# Patient Record
Sex: MALE | Race: WHITE | NOT HISPANIC OR LATINO | Employment: OTHER | URBAN - METROPOLITAN AREA
[De-identification: names, ages, dates, MRNs, and addresses within clinical notes are randomized per-mention and may not be internally consistent; named-entity substitution may affect disease eponyms.]

---

## 2017-07-14 ENCOUNTER — TRANSCRIBE ORDERS (OUTPATIENT)
Dept: URGENT CARE | Facility: MEDICAL CENTER | Age: 72
End: 2017-07-14

## 2017-07-14 ENCOUNTER — OFFICE VISIT (OUTPATIENT)
Dept: URGENT CARE | Facility: MEDICAL CENTER | Age: 72
End: 2017-07-14
Payer: COMMERCIAL

## 2017-07-14 ENCOUNTER — APPOINTMENT (OUTPATIENT)
Dept: RADIOLOGY | Facility: MEDICAL CENTER | Age: 72
End: 2017-07-14
Payer: COMMERCIAL

## 2017-07-14 ENCOUNTER — GENERIC CONVERSION - ENCOUNTER (OUTPATIENT)
Dept: OTHER | Facility: OTHER | Age: 72
End: 2017-07-14

## 2017-07-14 DIAGNOSIS — S69.91XA UNSPECIFIED INJURY OF RIGHT WRIST, HAND AND FINGER(S), INITIAL ENCOUNTER: ICD-10-CM

## 2017-07-14 DIAGNOSIS — M25.532 PAIN IN LEFT WRIST: ICD-10-CM

## 2017-07-14 PROCEDURE — S9088 SERVICES PROVIDED IN URGENT: HCPCS

## 2017-07-14 PROCEDURE — 99213 OFFICE O/P EST LOW 20 MIN: CPT

## 2017-07-14 PROCEDURE — 73110 X-RAY EXAM OF WRIST: CPT

## 2018-01-16 NOTE — MISCELLANEOUS
Message  Message Free Text Note Form: Pt phone not in service when attempting to call and inform pt of possible fracture        Signatures   Electronically signed by : Ramón Eng, [facility]; Nov  3 2017 12:19AM EST                       (Author)

## 2018-01-20 NOTE — PROGRESS NOTES
Assessment   1  Right wrist injury (959 3) (S69 91XA)    Plan    * XR WRIST 3+ VIEW RIGHT; Status:Resulted - Requires Verification;   Done: [id number] 12:00AM     Order Comments:Rm 3       No w/c; QST:[id number];RUOEXEG; Stat;       For:Right wrist injury; Ordered By:Lyle Glover; Discussion/Summary   Discussion Summary:    Rice measures  Take motrin as directed for pain  F/u with ortho no improvement in 1-2 weeks  Medication Side Effects Reviewed: Possible side effects of new medications were reviewed with the patient/guardian today  Understands and agrees with treatment plan: The treatment plan was reviewed with the patient/guardian  The patient/guardian understands and agrees with the treatment plan      Chief Complaint   Chief Complaint Free Text Note Form: pt presents with right hand mosqueda site medial pain r/t to fall on 7/10/17, tripped, braced for fall by putting out hands to stop fall and landed on right hand; states ice and rest, wrap did not help relieve the pain      History of Present Illness   HPI: This is a 70 y/o M c/o R wrist pain s/p fall x 4 days ago  Pt reports he fell o out stretched wrist landing on cement  Pt denies hitting head, LOC  Pt reports decrease strength and pain with any grasping  Denies any numbness, tingling, loss of sensation  Denies previous injury to wrist  Pt is R hand dom  Review of Systems   Focused-Male:      Constitutional: no fever or chills, feels well, no tiredness, no recent weight loss or weight gain  ENT: no complaints of earache, no loss of hearing, no nosebleeds or nasal discharge, no sore throat or hoarseness  Cardiovascular: no complaints of slow or fast heart rate, no chest pain, no palpitations, no leg claudication or lower extremity edema  Respiratory: no complaints of shortness of breath, no wheezing or cough, no dyspnea on exertion, no orthopnea or PND        Gastrointestinal: no complaints of abdominal pain, no constipation, no nausea or vomiting, no diarrhea or bloody stools  Genitourinary: no complaints of dysuria or incontinence, no hesitancy, no nocturia, no genital lesion, no inadequacy of penile erection  Musculoskeletal: no complaints of arthralgia, no myalgia, no joint swelling or stiffness, no limb pain or swelling-- and-- as noted in HPI  Integumentary: no complaints of skin rash or lesion, no itching or dry skin, no skin wounds  Neurological: no complaints of headache, no confusion, no numbness or tingling, no dizziness or fainting  Past Medical History   1  History of high cholesterol (V12 29) (Z86 39)   2  History of hypertension (V12 59) (Z86 79)  Active Problems And Past Medical History Reviewed: The active problems and past medical history were reviewed and updated today  Family History   Mother    1  No pertinent family history  Family History Reviewed: The family history was reviewed and updated today  Social History    · Alcohol use (V49 89) (Z78 9)   · Never a smoker   · No illicit drug use  Social History Reviewed: The social history was reviewed and updated today  Surgical History   Surgical History Reviewed: The surgical history was reviewed and updated today  Current Meds    1  Crestor TABS; Therapy: (Recorded:[id number]) to Recorded   2  Zetia TABS; Therapy: (Recorded:[id number]) to Recorded  Medication List Reviewed: The medication list was reviewed and updated today  Allergies   1   No Known Drug Allergies    Vitals   Signs   Recorded: [id number] 01:25PM   Height: 5 ft 10 in  BMI Calculated: 27 62  BSA Calculated: 2 05  Recorded: 05Uid4381 01:18PM   Temperature: 98 4 F, Oral  Heart Rate: 63  Pulse Quality: Regular  Respiration: 16  Systolic: 439, Sitting  Diastolic: 86, Sitting  Weight: 192 lb 8 oz  O2 Saturation: 98, RA  Pain Scale: 4/10    Physical Exam        Constitutional      General appearance: No acute distress, well appearing and well nourished  Eyes      Conjunctiva and lids: No swelling, erythema, or discharge  Pupils and irises: Equal, round and reactive to light  Ears, Nose, Mouth, and Throat      External inspection of ears and nose: Normal        Otoscopic examination: Tympanic membrance translucent with normal light reflex  Canals patent without erythema  Nasal mucosa, septum, and turbinates: Normal without edema or erythema  Oropharynx: Normal with no erythema, edema, exudate or lesions  Pulmonary      Respiratory effort: No increased work of breathing or signs of respiratory distress  Auscultation of lungs: Clear to auscultation  Cardiovascular      Palpation of heart: Normal PMI, no thrills  Auscultation of heart: Normal rate and rhythm, normal S1 and S2, without murmurs  Examination of extremities for edema and/or varicosities: Normal        Abdomen      Abdomen: Non-tender, no masses  Liver and spleen: No hepatomegaly or splenomegaly  Lymphatic      Palpation of lymph nodes in neck: No lymphadenopathy  Musculoskeletal      Gait and station: Normal        Digits and nails: Normal without clubbing or cyanosis  Inspection/palpation of joints, bones, and muscles: Abnormal  -- R wrist: FROM, +3 strength, NVI, - snuff boxtenderness  Skin      Skin and subcutaneous tissue: Normal without rashes or lesions  Neurologic      Cranial nerves: Cranial nerves 2-12 intact  Reflexes: 2+ and symmetric  Sensation: No sensory loss         Psychiatric      Orientation to person, place and time: Normal        Mood and affect: Normal        Signatures    Electronically signed by : SHIRLEY Robertson; Jan 18 2018  9:00PM EST                       (Author)     Electronically signed by : REX Ambrosio ; Jan 19 2018 10:15AM EST                       (Co-author)

## 2018-05-06 ENCOUNTER — OFFICE VISIT (OUTPATIENT)
Dept: URGENT CARE | Facility: MEDICAL CENTER | Age: 73
End: 2018-05-06
Payer: COMMERCIAL

## 2018-05-06 VITALS
TEMPERATURE: 98.4 F | HEART RATE: 68 BPM | OXYGEN SATURATION: 100 % | SYSTOLIC BLOOD PRESSURE: 118 MMHG | DIASTOLIC BLOOD PRESSURE: 76 MMHG | RESPIRATION RATE: 18 BRPM | WEIGHT: 205 LBS

## 2018-05-06 DIAGNOSIS — T63.484A ALLERGIC REACTION TO INSECT STING, UNDETERMINED INTENT, INITIAL ENCOUNTER: Primary | ICD-10-CM

## 2018-05-06 PROCEDURE — 99213 OFFICE O/P EST LOW 20 MIN: CPT | Performed by: PHYSICIAN ASSISTANT

## 2018-05-06 RX ORDER — ROSUVASTATIN CALCIUM 5 MG/1
TABLET, COATED ORAL
COMMUNITY

## 2018-05-06 RX ORDER — EZETIMIBE 10 MG/1
TABLET ORAL
COMMUNITY

## 2018-05-06 RX ORDER — LOSARTAN POTASSIUM 25 MG/1
25 TABLET ORAL DAILY
COMMUNITY

## 2018-05-06 RX ORDER — PREDNISONE 20 MG/1
20 TABLET ORAL 2 TIMES DAILY WITH MEALS
Qty: 10 TABLET | Refills: 0 | Status: SHIPPED | OUTPATIENT
Start: 2018-05-06 | End: 2018-05-11

## 2018-05-06 NOTE — PROGRESS NOTES
[facility]        NAME: Mikayla Hanson is a 71 y o  male  : 1945    MRN: [id number]  DATE: May 6, 2018  TIME: 3:06 PM    Assessment and Plan   Allergic reaction to insect sting, undetermined intent, initial encounter [T63 484A]  1  Allergic reaction to insect sting, undetermined intent, initial encounter  predniSONE 20 mg tablet         Patient Instructions       Follow up with PCP in 3-5 days  Proceed to  ER if symptoms worsen  Chief Complaint     Chief Complaint   Patient presents with    Hand Swelling     was stung by bee on friday, as redness and swelling at site          History of Present Illness       The patient is a 60-year-old male presents with redness and swelling of his right index finger after being stone bite be 2 days prior  The patient denies any pain but does state the he is finger is swollen and itchy  He denies any weakness or loss of function, patient denies any stings on other parts of his skin  Review of Systems   Review of Systems   Constitutional: Negative  HENT: Negative  Skin: Positive for wound  Current Medications       Current Outpatient Prescriptions:     losartan (COZAAR) 25 mg tablet, Take 25 mg by mouth daily, Disp: , Rfl:     ezetimibe (ZETIA) 10 mg tablet, Take by mouth, Disp: , Rfl:     predniSONE 20 mg tablet, Take 1 tablet (20 mg total) by mouth 2 (two) times a day with meals for 5 days, Disp: 10 tablet, Rfl: 0    rosuvastatin (CRESTOR) 5 mg tablet, Take by mouth, Disp: , Rfl:     Current Allergies     Allergies as of 2018    (No Known Allergies)            The following portions of the patient's history were reviewed and updated as appropriate: allergies, current medications, past family history, past medical history, past social history, past surgical history and problem list      Past Medical History:   Diagnosis Date    Hyperlipidemia     Hypertension        No past surgical history on file      No family history on file  Medications have been verified  Objective   /76   Pulse 68   Temp 98 4 °F (36 9 °C) (Temporal)   Resp 18   Wt 93 kg (205 lb)   SpO2 100%        Physical Exam     Physical Exam   Constitutional: He appears well-developed and well-nourished  No distress  Cardiovascular: Normal rate, regular rhythm and normal heart sounds  No murmur heard    Pulmonary/Chest: Effort normal and breath sounds normal    Musculoskeletal:        Arms:

## 2018-05-06 NOTE — PATIENT INSTRUCTIONS
1  Cool compresses to skin 5-8 min 3-4x daily  2  Take Prednisone 20mg  1 tablet twice daily x 3-5 days  3  Benadryl as needed if itchy  4   Follow-up with PCP if symptoms persist

## 2018-09-14 ENCOUNTER — OFFICE VISIT (OUTPATIENT)
Dept: URGENT CARE | Facility: MEDICAL CENTER | Age: 73
End: 2018-09-14
Payer: COMMERCIAL

## 2018-09-14 VITALS
DIASTOLIC BLOOD PRESSURE: 72 MMHG | WEIGHT: 198.6 LBS | SYSTOLIC BLOOD PRESSURE: 130 MMHG | HEART RATE: 76 BPM | RESPIRATION RATE: 12 BRPM | TEMPERATURE: 98.2 F | BODY MASS INDEX: 30.1 KG/M2 | HEIGHT: 68 IN | OXYGEN SATURATION: 98 %
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
## 2018-09-14 DIAGNOSIS — S40.261A INSECT BITE OF RIGHT SHOULDER, INITIAL ENCOUNTER: Primary | ICD-10-CM

## 2018-09-14 PROCEDURE — 99213 OFFICE O/P EST LOW 20 MIN: CPT | Performed by: PHYSICIAN ASSISTANT

## 2018-09-14 RX ORDER — PREDNISONE 20 MG/1
40 TABLET ORAL DAILY
Qty: 10 TABLET | Refills: 0 | Status: SHIPPED | OUTPATIENT
Start: 2018-09-14 | End: 2018-09-19

## 2018-09-14 NOTE — PROGRESS NOTES
[facility]        NAME: Bárbara Godinez is a 68 y o  male  : 1945    MRN: [id number]  DATE: 2018  TIME: 12:00 PM    Assessment and Plan   Insect bite of right shoulder, initial encounter [S40 261A, W57  XXXA]  1  Insect bite of right shoulder, initial encounter  predniSONE 20 mg tablet         Patient Instructions       Take medications as directed  Drink plenty of fluids  Follow up with family doctor this week  Go to ER immediately if new or worsening symptoms occur  Chief Complaint     Chief Complaint   Patient presents with   [address]     pt was stung x2  days ago and having a reaction to the stinger  One site is his right upper shoulder/and top of head area is red and inflammed and itchy- pt has tried OTC meds but not working         History of Present Illness       Insect Bite   This is a new problem  Episode onset: Two days ago patient was stung by a bee and right shoulder and right occipital scalp  Patient has had itchiness since then  Pertinent negatives include no abdominal pain, chest pain, chills, congestion, diaphoresis, fatigue, fever, headaches, nausea, numbness, rash, sore throat, swollen glands, vomiting or weakness  Nothing aggravates the symptoms  Treatments tried: A generic antihistamine  The treatment provided moderate relief  Review of Systems   Review of Systems   Constitutional: Negative for chills, diaphoresis, fatigue and fever  HENT: Negative for congestion, facial swelling, postnasal drip, sinus pressure, sore throat and trouble swallowing  Eyes: Negative  Respiratory: Negative for chest tightness, shortness of breath and wheezing  Cardiovascular: Negative  Negative for chest pain and palpitations  Gastrointestinal: Negative for abdominal pain, diarrhea, nausea and vomiting  Endocrine: Negative  Genitourinary: Negative for dysuria  Musculoskeletal: Negative  Skin: Negative for pallor and rash     Allergic/Immunologic: Negative  Neurological: Negative  Negative for weakness, light-headedness, numbness and headaches  Hematological: Negative  Psychiatric/Behavioral: Negative  Current Medications       Current Outpatient Prescriptions:     ezetimibe (ZETIA) 10 mg tablet, Take by mouth, Disp: , Rfl:     losartan (COZAAR) 25 mg tablet, Take 25 mg by mouth daily, Disp: , Rfl:     predniSONE 20 mg tablet, Take 2 tablets (40 mg total) by mouth daily for 5 days, Disp: 10 tablet, Rfl: 0    rosuvastatin (CRESTOR) 5 mg tablet, Take by mouth, Disp: , Rfl:     Current Allergies     Allergies as of 09/14/2018 - Reviewed 09/14/2018   Allergen Reaction Noted    Amoxicillin  06/28/2017    Atorvastatin GI Intolerance 02/08/2016    Cerivastatin GI Intolerance 02/08/2016    Colchicine Diarrhea 02/08/2016    Fluvastatin  02/08/2016    Pitavastatin Myalgia 02/08/2016    Statins Myalgia 02/08/2016    Verapamil  02/08/2016            The following portions of the patient's history were reviewed and updated as appropriate: allergies, current medications, past family history, past medical history, past social history, past surgical history and problem list      Past Medical History:   Diagnosis Date    Hyperlipidemia     Hypertension        History reviewed  No pertinent surgical history  Family History   Problem Relation Age of Onset    No Known Problems Mother     No Known Problems Father          Medications have been verified  Objective   /72   Pulse 76   Temp 98 2 °F (36 8 °C) (Temporal)   Resp 12   Ht 5' 8" (1 727 m)   Wt 90 1 kg (198 lb 9 6 oz)   SpO2 98%   BMI 30 20 kg/m²        Physical Exam     Physical Exam   Constitutional: He appears well-developed and well-nourished  No distress  HENT:   Head: Normocephalic and atraumatic  Right Ear: External ear normal    Left Ear: External ear normal    Nose: Nose normal    Mouth/Throat: Oropharynx is clear and moist  No oropharyngeal exudate  Cardiovascular: Normal rate, regular rhythm, normal heart sounds and intact distal pulses  Pulmonary/Chest: Effort normal and breath sounds normal  No respiratory distress  He has no wheezes  He has no rales  Skin: Skin is warm  No rash noted  He is not diaphoretic  Red, race, warm quarter-sized insect bite to right occipital area  Silver dollar sized insect bite to right shoulder area  No excoriations  No surrounding erythema  No venous striking   Nursing note and vitals reviewed

## 2018-09-14 NOTE — PATIENT INSTRUCTIONS
Take medications as directed  Drink plenty of fluids  Follow up with family doctor this week  Go to ER immediately if new or worsening symptoms occur  Insect Bite or Sting   WHAT YOU NEED TO KNOW:   Most insect bites and stings are not dangerous and go away without treatment  Your symptoms may be mild, or you may develop anaphylaxis  Anaphylaxis is a sudden, life-threatening reaction that needs immediate treatment  Common examples of insects that bite or sting are bees, ticks, mosquitoes, spiders, and ants  Insect bites or stings can lead to diseases such as malaria, West Nile virus, Lyme disease, or [place] Spotted Fever  DISCHARGE INSTRUCTIONS:   Call 911 for signs or symptoms of anaphylaxis,  such as trouble breathing, swelling in your mouth or throat, or wheezing  You may also have itching, a rash, hives, or feel like you are going to faint  Return to the emergency department if:   · You are stung on your tongue or in your throat  · A white area forms around the bite  · You are sweating badly or have body pain  · You think you were bitten or stung by a poisonous insect  Contact your healthcare provider if:   · You have a fever  · The area becomes red, warm, tender, and swollen beyond the area of the bite or sting  · You have questions or concerns about your condition or care  Medicines:   · Antihistamines  decrease itching and rash  · Epinephrine  is used to treat severe allergic reactions such as anaphylaxis  · Take your medicine as directed  Contact your healthcare provider if you think your medicine is not helping or if you have side effects  Tell him of her if you are allergic to any medicine  Keep a list of the medicines, vitamins, and herbs you take  Include the amounts, and when and why you take them  Bring the list or the pill bottles to follow-up visits  Carry your medicine list with you in case of an emergency    Steps to take for signs or symptoms of anaphylaxis:   · Immediately  give 1 shot of epinephrine only into the outer thigh muscle  · Leave the shot in place  as directed  Your healthcare provider may recommend you leave it in place for up to 10 seconds before you remove it  This helps make sure all of the epinephrine is delivered  · Call 911 and go to the emergency department,  even if the shot improved symptoms  Do not drive yourself  Bring the used epinephrine shot with you  Safety precautions to take if you are at risk for anaphylaxis:   · Keep 2 shots of epinephrine with you at all times  You may need a second shot, because epinephrine only works for about 20 minutes and symptoms may return  Your healthcare provider can show you and family members how to give the shot  Check the expiration date every month and replace it before it expires  · Create an action plan  Your healthcare provider can help you create a written plan that explains the allergy and an emergency plan to treat a reaction  The plan explains when to give a second epinephrine shot if symptoms return or do not improve after the first  Give copies of the action plan and emergency instructions to family members, work and school staff, and  providers  Show them how to give a shot of epinephrine  · Carry medical alert identification  Wear medical alert jewelry or carry a card that says you have an insect allergy  Ask your healthcare provider where to get these items  If an insect bites or stings you:   · Remove the stinger  Scrape the stinger out with your fingernail, edge of a credit card, or a knife blade  Do not squeeze the wound  Gently wash the area with soap and water  · Remove the tick  Ticks must be removed as soon as possible so you do not get diseases passed through tick bites  Ask your healthcare provider for more information on tick bites and how to remove ticks  Care for a bite or sting wound:   · Elevate the affected area    Prop the wound above the level of your heart, if possible  Elevate the area for 10 to 20 minutes each hour or as directed by your healthcare provider  · Use compresses  Soak a clean washcloth in cold water, wring it out, and put it on the bite or sting  Use the compress for 10 to 20 minutes each hour or as directed by your healthcare provider  After 24 to 48 hours, change to warm compresses  · Apply a paste  Add water to baking soda to make a thick paste  Put the paste on the area for 5 minutes  Rinse gently to remove the paste  Prevent another insect bite or sting:   · Do not wear bright-colored or flower-print clothing when you plan to spend time outdoors  Do not use hairspray, perfumes, or aftershave  · Do not leave food out  · Empty any standing water and wash container with soap and water every 2 days  · Put screens on all open windows and doors  · Put insect repellent that contains DEET on skin that is showing when you go outside  Put insect repellent at the top of your boots, bottom of pant legs, and sleeve cuffs  Wear long sleeves, pants, and shoes  · Use citronella candles outdoors to help keep mosquitoes away  Put a tick and flea collar on pets  Follow up with your healthcare provider as directed:  Write down your questions so you remember to ask them during your visits  © 2017 [employer] Information is for End User's use only and may not be sold, redistributed or otherwise used for commercial purposes  All illustrations and images included in CareNotes® are the copyrighted property of [employer]  or [employer]  The above information is an  only  It is not intended as medical advice for individual conditions or treatments  Talk to your doctor, nurse or pharmacist before following any medical regimen to see if it is safe and effective for you

## 2019-03-18 ENCOUNTER — OFFICE VISIT (OUTPATIENT)
Dept: URGENT CARE | Facility: MEDICAL CENTER | Age: 74
End: 2019-03-18
Payer: COMMERCIAL

## 2019-03-18 ENCOUNTER — APPOINTMENT (OUTPATIENT)
Dept: RADIOLOGY | Facility: MEDICAL CENTER | Age: 74
End: 2019-03-18
Payer: COMMERCIAL

## 2019-03-18 VITALS
SYSTOLIC BLOOD PRESSURE: 150 MMHG | HEART RATE: 59 BPM | HEIGHT: 68 IN | OXYGEN SATURATION: 100 % | RESPIRATION RATE: 20 BRPM | TEMPERATURE: 96.3 F | WEIGHT: 200 LBS | BODY MASS INDEX: 30.31 KG/M2 | DIASTOLIC BLOOD PRESSURE: 90 MMHG

## 2019-03-18 DIAGNOSIS — S61.219A LACERATION WITHOUT FOREIGN BODY OF UNSPECIFIED FINGER WITHOUT DAMAGE TO NAIL, INITIAL ENCOUNTER: Primary | ICD-10-CM

## 2019-03-18 DIAGNOSIS — S61.219A LACERATION WITHOUT FOREIGN BODY OF UNSPECIFIED FINGER WITHOUT DAMAGE TO NAIL, INITIAL ENCOUNTER: ICD-10-CM

## 2019-03-18 PROCEDURE — 90715 TDAP VACCINE 7 YRS/> IM: CPT

## 2019-03-18 PROCEDURE — 73130 X-RAY EXAM OF HAND: CPT

## 2019-03-18 PROCEDURE — 99213 OFFICE O/P EST LOW 20 MIN: CPT | Performed by: PHYSICIAN ASSISTANT

## 2019-03-18 RX ORDER — SULFAMETHOXAZOLE AND TRIMETHOPRIM 800; 160 MG/1; MG/1
1 TABLET ORAL EVERY 12 HOURS SCHEDULED
Qty: 14 TABLET | Refills: 0 | Status: SHIPPED | OUTPATIENT
Start: 2019-03-18 | End: 2019-03-25

## 2019-03-18 NOTE — PROGRESS NOTES
[facility]        NAME: Jeff Cantor is a 68 y o  male  : 1945    MRN: [id number]  DATE: 2019  TIME: 5:42 PM    Assessment and Plan   Laceration without foreign body of unspecified finger without damage to nail, initial encounter [S61 219A]  1  Laceration without foreign body of unspecified finger without damage to nail, initial encounter  XR hand 3+ vw left    TDAP Vaccine greater than or equal to 8yo    sulfamethoxazole-trimethoprim (BACTRIM DS) 800-160 mg per tablet         Patient Instructions     Laceration left fingers    Follow up with PCP in 3-5 days  Proceed to  ER if symptoms worsen  Chief Complaint     Chief Complaint   Patient presents with    Wound Check     today, lac of Lefft 3rd and 4th digit from chain saw, bleeding is controlled         History of Present Illness       67 y/o male presents c/o having cut tip of his fingers with chain saw 30 min ago      Review of Systems   Review of Systems   Constitutional: Negative  HENT: Negative  Eyes: Negative  Respiratory: Negative  Negative for apnea, cough, choking, chest tightness, shortness of breath, wheezing and stridor  Cardiovascular: Negative  Negative for chest pain  Skin: Positive for wound           Current Medications       Current Outpatient Medications:     ezetimibe (ZETIA) 10 mg tablet, Take by mouth, Disp: , Rfl:     losartan (COZAAR) 25 mg tablet, Take 25 mg by mouth daily, Disp: , Rfl:     rosuvastatin (CRESTOR) 5 mg tablet, Take by mouth, Disp: , Rfl:     sulfamethoxazole-trimethoprim (BACTRIM DS) 800-160 mg per tablet, Take 1 tablet by mouth every 12 (twelve) hours for 7 days, Disp: 14 tablet, Rfl: 0    Current Allergies     Allergies as of 2019 - Reviewed 2019   Allergen Reaction Noted    Amoxicillin  2017    Atorvastatin GI Intolerance 2016    Cerivastatin GI Intolerance 2016    Colchicine Diarrhea 2016    Fluvastatin  2016    Pitavastatin Myalgia 02/08/2016    Statins Myalgia 02/08/2016    Verapamil  02/08/2016            The following portions of the patient's history were reviewed and updated as appropriate: allergies, current medications, past family history, past medical history, past social history, past surgical history and problem list      Past Medical History:   Diagnosis Date    Hyperlipidemia     Hypertension        History reviewed  No pertinent surgical history  Family History   Problem Relation Age of Onset    No Known Problems Mother     No Known Problems Father          Medications have been verified  Objective   /90   Pulse 59   Temp (!) 96 3 °F (35 7 °C) (Temporal)   Resp 20   Ht 5' 8" (1 727 m)   Wt 90 7 kg (200 lb)   SpO2 100%   BMI 30 41 kg/m²        Physical Exam     Physical Exam   Constitutional: He appears well-developed and well-nourished  No distress  Neck: Normal range of motion  Neck supple  Cardiovascular: Normal rate, regular rhythm, normal heart sounds and intact distal pulses  Pulmonary/Chest: Effort normal and breath sounds normal  No respiratory distress  He has no wheezes  He has no rales  He exhibits no tenderness  Musculoskeletal:        Hands:  Lymphadenopathy:     He has no cervical adenopathy  Skin: He is not diaphoretic         Area cleaned and prepped in sterile fashion with iodine, xeroform used for dressing    xrays negative

## 2019-03-18 NOTE — PATIENT INSTRUCTIONS
Laceration left fingers    Follow up with PCP in 3-5 days  Proceed to  ER if symptoms worsen  Laceration   WHAT YOU NEED TO KNOW:   A laceration is an injury to the skin and the soft tissue underneath it  Lacerations happen when you are cut or hit by something  They can happen anywhere on the body  DISCHARGE INSTRUCTIONS:   Return to the emergency department if:   · You have heavy bleeding or bleeding that does not stop after 10 minutes of holding firm, direct pressure over the wound  · Your wound opens up  Contact your healthcare provider if:   · You have a fever or chills  · Your laceration is red, warm, or swollen  · You have red streaks on your skin coming from your wound  · You have white or yellow drainage from the wound that smells bad  · You have pain that gets worse, even after treatment  · You have questions or concerns about your condition or care  Medicines:   · Prescription pain medicine  may be given  Ask how to take this medicine safely  · Antibiotics  help treat or prevent a bacterial infection  · Take your medicine as directed  Contact your healthcare provider if you think your medicine is not helping or if you have side effects  Tell him or her if you are allergic to any medicine  Keep a list of the medicines, vitamins, and herbs you take  Include the amounts, and when and why you take them  Bring the list or the pill bottles to follow-up visits  Carry your medicine list with you in case of an emergency  Care for your wound as directed:   · Do not get your wound wet  until your healthcare provider says it is okay  Do not soak your wound in water  Do not go swimming until your healthcare provider says it is okay  Carefully wash the wound with soap and water  Gently pat the area dry or allow it to air dry  · Change your bandages  when they get wet, dirty, or after washing  Apply new, clean bandages as directed  Do not apply elastic bandages or tape too tight   Do not put powders or lotions over your incision  · Apply antibiotic ointment as directed  Your healthcare provider may give you antibiotic ointment to put over your wound if you have stitches  If you have strips of tape over your incision, let them dry up and fall off on their own  If they do not fall off within 14 days, gently remove them  If you have glue over your wound, do not remove or pick at it  If your glue comes off, do not replace it with glue that you have at home  · Check your wound every day for signs of infection such as swelling, redness, or pus  Self-care:   · Apply ice  on your wound for 15 to 20 minutes every hour or as directed  Use an ice pack, or put crushed ice in a plastic bag  Cover it with a towel  Ice helps prevent tissue damage and decreases swelling and pain  · Use a splint as directed  A splint will decrease movement and stress on your wound  It may help it heal faster  A splint may be used for lacerations over joints or areas of your body that bend  Ask your healthcare provider how to apply and remove a splint  · Decrease scarring of your wound  by applying ointments as directed  Do not apply ointments until your healthcare provider says it is okay  You may need to wait until your wound is healed  Ask which ointment to buy and how often to use it  After your wound is healed, use sunscreen over the area when you are out in the sun  You should do this for at least 6 months to 1 year after your injury  Follow up with your healthcare provider as directed: You may need to follow up in 24 to 48 hours to have your wound checked for infection  You will need to return in 3 to 14 days if you have stitches or staples so they can be removed  Care for your wound as directed to prevent infection and help it heal  Write down your questions so you remember to ask them during your visits    © 2017 [employer] Information is for End User's use only and may not be sold, redistributed or otherwise used for commercial purposes  All illustrations and images included in CareNotes® are the copyrighted property of [employer]  or [employer]  The above information is an  only  It is not intended as medical advice for individual conditions or treatments  Talk to your doctor, nurse or pharmacist before following any medical regimen to see if it is safe and effective for you

## 2019-03-19 ENCOUNTER — OFFICE VISIT (OUTPATIENT)
Dept: URGENT CARE | Facility: MEDICAL CENTER | Age: 74
End: 2019-03-19
Payer: COMMERCIAL

## 2019-03-19 VITALS
RESPIRATION RATE: 20 BRPM | HEIGHT: 68 IN | WEIGHT: 205.38 LBS | OXYGEN SATURATION: 100 % | TEMPERATURE: 97 F | SYSTOLIC BLOOD PRESSURE: 164 MMHG | BODY MASS INDEX: 31.13 KG/M2 | HEART RATE: 61 BPM | DIASTOLIC BLOOD PRESSURE: 87 MMHG

## 2019-03-19 DIAGNOSIS — S61.215A LACERATION OF LEFT RING FINGER WITHOUT FOREIGN BODY, NAIL DAMAGE STATUS UNSPECIFIED, INITIAL ENCOUNTER: Primary | ICD-10-CM

## 2019-03-19 PROCEDURE — 99213 OFFICE O/P EST LOW 20 MIN: CPT | Performed by: PHYSICIAN ASSISTANT

## 2019-03-19 NOTE — PATIENT INSTRUCTIONS
Avulsion of distal tuft - wound chek  Follow up with Dr Valentine Merlos 3/20  Follow up with PCP in 3-5 days  Proceed to  ER if symptoms worsen

## 2019-03-19 NOTE — PROGRESS NOTES
[facility]        NAME: Alyssia Seen is a 68 y o  male  : 1945    MRN: [id number]  DATE: 2019  TIME: 1:56 PM    Assessment and Plan   Laceration of left ring finger without foreign body, nail damage status unspecified, initial encounter [S61 215A]  1  Laceration of left ring finger without foreign body, nail damage status unspecified, initial encounter           Patient Instructions     Avulsion of distal tuft - wound chek  Follow up with Dr Adal Pineda 3/20  Follow up with PCP in 3-5 days  Proceed to  ER if symptoms worsen  Chief Complaint     Chief Complaint   Patient presents with    Wound Check     f/u cut of fingers from yesterday  History of Present Illness       Presents for wound check, denies fever, chills, n/v      Review of Systems   Review of Systems   Constitutional: Negative  HENT: Negative  Eyes: Negative  Respiratory: Negative  Negative for apnea, cough, choking, chest tightness, shortness of breath, wheezing and stridor  Cardiovascular: Negative  Negative for chest pain  Skin: Positive for wound           Current Medications       Current Outpatient Medications:     ezetimibe (ZETIA) 10 mg tablet, Take by mouth, Disp: , Rfl:     losartan (COZAAR) 25 mg tablet, Take 25 mg by mouth daily, Disp: , Rfl:     rosuvastatin (CRESTOR) 5 mg tablet, Take by mouth, Disp: , Rfl:     sulfamethoxazole-trimethoprim (BACTRIM DS) 800-160 mg per tablet, Take 1 tablet by mouth every 12 (twelve) hours for 7 days, Disp: 14 tablet, Rfl: 0    Current Allergies     Allergies as of 2019 - Reviewed 2019   Allergen Reaction Noted    Amoxicillin  2017    Atorvastatin GI Intolerance 2016    Cerivastatin GI Intolerance 2016    Colchicine Diarrhea 2016    Fluvastatin  2016    Pitavastatin Myalgia 2016    Statins Myalgia 2016    Verapamil  2016            The following portions of the patient's history were reviewed and updated as appropriate: allergies, current medications, past family history, past medical history, past social history, past surgical history and problem list      Past Medical History:   Diagnosis Date    Hyperlipidemia     Hypertension        History reviewed  No pertinent surgical history  Family History   Problem Relation Age of Onset    No Known Problems Mother     No Known Problems Father          Medications have been verified  Objective   /87   Pulse 61   Temp (!) 97 °F (36 1 °C) (Temporal)   Resp 20   Ht 5' 8" (1 727 m)   Wt 93 2 kg (205 lb 6 oz)   SpO2 100%   BMI 31 23 kg/m²        Physical Exam     Physical Exam   Constitutional: He appears well-developed and well-nourished  No distress  Neck: Normal range of motion  Neck supple  Cardiovascular: Normal rate, regular rhythm, normal heart sounds and intact distal pulses  Pulmonary/Chest: Effort normal and breath sounds normal  No respiratory distress  He has no wheezes  He has no rales  He exhibits no tenderness  Musculoskeletal:        Hands:  Lymphadenopathy:     He has no cervical adenopathy  Skin: He is not diaphoretic

## 2019-05-02 ENCOUNTER — APPOINTMENT (EMERGENCY)
Dept: CT IMAGING | Facility: HOSPITAL | Age: 74
End: 2019-05-02
Payer: COMMERCIAL

## 2019-05-02 ENCOUNTER — HOSPITAL ENCOUNTER (OUTPATIENT)
Facility: HOSPITAL | Age: 74
Setting detail: OBSERVATION
Discharge: HOME | End: 2019-05-05
Attending: EMERGENCY MEDICINE | Admitting: INTERNAL MEDICINE
Payer: COMMERCIAL

## 2019-05-02 DIAGNOSIS — M54.9 BACK PAIN: ICD-10-CM

## 2019-05-02 DIAGNOSIS — M54.50 ACUTE RIGHT-SIDED LOW BACK PAIN WITHOUT SCIATICA: ICD-10-CM

## 2019-05-02 DIAGNOSIS — K59.00 CONSTIPATION: ICD-10-CM

## 2019-05-02 DIAGNOSIS — R26.2 AMBULATORY DYSFUNCTION: ICD-10-CM
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
## 2019-05-02 DIAGNOSIS — M62.838 MUSCLE SPASM: ICD-10-CM

## 2019-05-02 LAB
ANION GAP BLD CALC-SCNC: 14 MMOL/L (ref 4–13)
APTT PPP: 26 SECONDS (ref 26–38)
BASOPHILS # BLD AUTO: 0.05 THOUSANDS/ΜL (ref 0–0.1)
BASOPHILS NFR BLD AUTO: 1 % (ref 0–1)
BUN BLD-MCNC: 26 MG/DL (ref 5–25)
CA-I BLD-SCNC: 1.11 MMOL/L (ref 1.12–1.32)
CHLORIDE BLD-SCNC: 105 MMOL/L (ref 100–108)
CREAT BLD-MCNC: 1.1 MG/DL (ref 0.6–1.3)
EOSINOPHIL # BLD AUTO: 0 THOUSAND/ΜL (ref 0–0.61)
EOSINOPHIL NFR BLD AUTO: 0 % (ref 0–6)
ERYTHROCYTE [DISTWIDTH] IN BLOOD BY AUTOMATED COUNT: 12.8 % (ref 11.6–15.1)
GFR SERPL CREATININE-BSD FRML MDRD: 67 ML/MIN/1.73SQ M
GLUCOSE SERPL-MCNC: 118 MG/DL (ref 65–140)
HCT VFR BLD AUTO: 44.6 % (ref 36.5–49.3)
HCT VFR BLD CALC: 43 % (ref 36.5–49.3)
HGB BLD-MCNC: 14.8 G/DL (ref 12–17)
HGB BLDA-MCNC: 14.6 G/DL (ref 12–17)
IMM GRANULOCYTES # BLD AUTO: 0.03 THOUSAND/UL (ref 0–0.2)
IMM GRANULOCYTES NFR BLD AUTO: 0 % (ref 0–2)
INR PPP: 0.98 (ref 0.86–1.17)
LYMPHOCYTES # BLD AUTO: 2.57 THOUSANDS/ΜL (ref 0.6–4.47)
LYMPHOCYTES NFR BLD AUTO: 24 % (ref 14–44)
MCH RBC QN AUTO: 32.5 PG (ref 26.8–34.3)
MCHC RBC AUTO-ENTMCNC: 33.2 G/DL (ref 31.4–37.4)
MCV RBC AUTO: 98 FL (ref 82–98)
MONOCYTES # BLD AUTO: 1.07 THOUSAND/ΜL (ref 0.17–1.22)
MONOCYTES NFR BLD AUTO: 10 % (ref 4–12)
NEUTROPHILS # BLD AUTO: 6.96 THOUSANDS/ΜL (ref 1.85–7.62)
NEUTS SEG NFR BLD AUTO: 65 % (ref 43–75)
NRBC BLD AUTO-RTO: 0 /100 WBCS
PCO2 BLD: 26 MMOL/L (ref 21–32)
PLATELET # BLD AUTO: 241 THOUSANDS/UL (ref 149–390)
PMV BLD AUTO: 9.8 FL (ref 8.9–12.7)
POTASSIUM BLD-SCNC: 4.4 MMOL/L (ref 3.5–5.3)
PROTHROMBIN TIME: 12.7 SECONDS (ref 11.8–14.2)
RBC # BLD AUTO: 4.55 MILLION/UL (ref 3.88–5.62)
SODIUM BLD-SCNC: 140 MMOL/L (ref 136–145)
SPECIMEN SOURCE: ABNORMAL
WBC # BLD AUTO: 10.68 THOUSAND/UL (ref 4.31–10.16)

## 2019-05-02 PROCEDURE — 74177 CT ABD & PELVIS W/CONTRAST: CPT

## 2019-05-02 PROCEDURE — 85014 HEMATOCRIT: CPT

## 2019-05-02 PROCEDURE — 93005 ELECTROCARDIOGRAM TRACING: CPT

## 2019-05-02 PROCEDURE — 72125 CT NECK SPINE W/O DYE: CPT

## 2019-05-02 PROCEDURE — 36415 COLL VENOUS BLD VENIPUNCTURE: CPT | Performed by: EMERGENCY MEDICINE

## 2019-05-02 PROCEDURE — 71260 CT THORAX DX C+: CPT

## 2019-05-02 PROCEDURE — 96374 THER/PROPH/DIAG INJ IV PUSH: CPT

## 2019-05-02 PROCEDURE — 99284 EMERGENCY DEPT VISIT MOD MDM: CPT | Performed by: EMERGENCY MEDICINE

## 2019-05-02 PROCEDURE — 85025 COMPLETE CBC W/AUTO DIFF WBC: CPT | Performed by: EMERGENCY MEDICINE

## 2019-05-02 PROCEDURE — 70450 CT HEAD/BRAIN W/O DYE: CPT

## 2019-05-02 PROCEDURE — 99285 EMERGENCY DEPT VISIT HI MDM: CPT

## 2019-05-02 PROCEDURE — 85730 THROMBOPLASTIN TIME PARTIAL: CPT | Performed by: EMERGENCY MEDICINE

## 2019-05-02 PROCEDURE — 80047 BASIC METABLC PNL IONIZED CA: CPT

## 2019-05-02 PROCEDURE — 85610 PROTHROMBIN TIME: CPT | Performed by: EMERGENCY MEDICINE

## 2019-05-02 RX ORDER — LANOLIN ALCOHOL/MO/W.PET/CERES
1 CREAM (GRAM) TOPICAL DAILY
COMMUNITY

## 2019-05-02 RX ORDER — ECHINACEA 400 MG
1000 CAPSULE ORAL DAILY
COMMUNITY

## 2019-05-02 RX ORDER — MORPHINE SULFATE 4 MG/ML
4 INJECTION, SOLUTION INTRAMUSCULAR; INTRAVENOUS ONCE
Status: COMPLETED | OUTPATIENT
Start: 2019-05-02 | End: 2019-05-02

## 2019-05-02 RX ORDER — METHYLDOPA 250 MG
1 TABLET ORAL DAILY
COMMUNITY

## 2019-05-02 RX ORDER — DIAPER,BRIEF,ADULT, DISPOSABLE
1200 EACH MISCELLANEOUS DAILY
COMMUNITY

## 2019-05-02 RX ADMIN — MORPHINE SULFATE 4 MG: 4 INJECTION INTRAVENOUS at 23:28

## 2019-05-03 PROBLEM — S09.90XA HEAD INJURY: Status: ACTIVE | Noted: 2019-05-03

## 2019-05-03 PROBLEM — E78.5 HYPERLIPIDEMIA: Status: ACTIVE | Noted: 2019-05-03

## 2019-05-03 PROBLEM — R26.2 AMBULATORY DYSFUNCTION: Status: ACTIVE | Noted: 2019-05-03
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
## 2019-05-03 PROBLEM — K76.0 FATTY INFILTRATION OF LIVER: Status: ACTIVE | Noted: 2019-05-03

## 2019-05-03 LAB
ALBUMIN SERPL BCP-MCNC: 3.6 G/DL (ref 3.5–5)
ALP SERPL-CCNC: 71 U/L (ref 46–116)
ALT SERPL W P-5'-P-CCNC: 28 U/L (ref 12–78)
ANION GAP SERPL CALCULATED.3IONS-SCNC: 11 MMOL/L (ref 4–13)
AST SERPL W P-5'-P-CCNC: 21 U/L (ref 5–45)
ATRIAL RATE: 63 BPM
BASOPHILS # BLD AUTO: 0.02 THOUSANDS/ΜL (ref 0–0.1)
BASOPHILS NFR BLD AUTO: 0 % (ref 0–1)
BILIRUB SERPL-MCNC: 0.5 MG/DL (ref 0.2–1)
BUN SERPL-MCNC: 27 MG/DL (ref 5–25)
CALCIUM SERPL-MCNC: 9.1 MG/DL (ref 8.3–10.1)
CHLORIDE SERPL-SCNC: 106 MMOL/L (ref 100–108)
CO2 SERPL-SCNC: 23 MMOL/L (ref 21–32)
CREAT SERPL-MCNC: 1.12 MG/DL (ref 0.6–1.3)
EOSINOPHIL # BLD AUTO: 0 THOUSAND/ΜL (ref 0–0.61)
EOSINOPHIL NFR BLD AUTO: 0 % (ref 0–6)
ERYTHROCYTE [DISTWIDTH] IN BLOOD BY AUTOMATED COUNT: 12.9 % (ref 11.6–15.1)
GFR SERPL CREATININE-BSD FRML MDRD: 65 ML/MIN/1.73SQ M
GLUCOSE P FAST SERPL-MCNC: 106 MG/DL (ref 65–99)
GLUCOSE SERPL-MCNC: 106 MG/DL (ref 65–140)
HCT VFR BLD AUTO: 40.8 % (ref 36.5–49.3)
HGB BLD-MCNC: 13.5 G/DL (ref 12–17)
IMM GRANULOCYTES # BLD AUTO: 0.02 THOUSAND/UL (ref 0–0.2)
IMM GRANULOCYTES NFR BLD AUTO: 0 % (ref 0–2)
LYMPHOCYTES # BLD AUTO: 2.73 THOUSANDS/ΜL (ref 0.6–4.47)
LYMPHOCYTES NFR BLD AUTO: 34 % (ref 14–44)
MAGNESIUM SERPL-MCNC: 2.3 MG/DL (ref 1.6–2.6)
MCH RBC QN AUTO: 32.5 PG (ref 26.8–34.3)
MCHC RBC AUTO-ENTMCNC: 33.1 G/DL (ref 31.4–37.4)
MCV RBC AUTO: 98 FL (ref 82–98)
MONOCYTES # BLD AUTO: 0.81 THOUSAND/ΜL (ref 0.17–1.22)
MONOCYTES NFR BLD AUTO: 10 % (ref 4–12)
NEUTROPHILS # BLD AUTO: 4.57 THOUSANDS/ΜL (ref 1.85–7.62)
NEUTS SEG NFR BLD AUTO: 56 % (ref 43–75)
NRBC BLD AUTO-RTO: 0 /100 WBCS
P AXIS: -40 DEGREES
PHOSPHATE SERPL-MCNC: 4.3 MG/DL (ref 2.3–4.1)
PLATELET # BLD AUTO: 203 THOUSANDS/UL (ref 149–390)
PMV BLD AUTO: 10.2 FL (ref 8.9–12.7)
POTASSIUM SERPL-SCNC: 3.9 MMOL/L (ref 3.5–5.3)
PR INTERVAL: 180 MS
PROT SERPL-MCNC: 6.7 G/DL (ref 6.4–8.2)
QRS AXIS: -53 DEGREES
QRSD INTERVAL: 108 MS
QT INTERVAL: 404 MS
QTC INTERVAL: 413 MS
RBC # BLD AUTO: 4.16 MILLION/UL (ref 3.88–5.62)
SODIUM SERPL-SCNC: 140 MMOL/L (ref 136–145)
T WAVE AXIS: 16 DEGREES
VENTRICULAR RATE: 63 BPM
WBC # BLD AUTO: 8.15 THOUSAND/UL (ref 4.31–10.16)

## 2019-05-03 PROCEDURE — 97167 OT EVAL HIGH COMPLEX 60 MIN: CPT

## 2019-05-03 PROCEDURE — 96375 TX/PRO/DX INJ NEW DRUG ADDON: CPT

## 2019-05-03 PROCEDURE — G8979 MOBILITY GOAL STATUS: HCPCS

## 2019-05-03 PROCEDURE — 84100 ASSAY OF PHOSPHORUS: CPT | Performed by: PHYSICIAN ASSISTANT

## 2019-05-03 PROCEDURE — G8987 SELF CARE CURRENT STATUS: HCPCS

## 2019-05-03 PROCEDURE — 80053 COMPREHEN METABOLIC PANEL: CPT | Performed by: PHYSICIAN ASSISTANT

## 2019-05-03 PROCEDURE — 97530 THERAPEUTIC ACTIVITIES: CPT

## 2019-05-03 PROCEDURE — 93010 ELECTROCARDIOGRAM REPORT: CPT | Performed by: INTERNAL MEDICINE

## 2019-05-03 PROCEDURE — 97163 PT EVAL HIGH COMPLEX 45 MIN: CPT

## 2019-05-03 PROCEDURE — 85025 COMPLETE CBC W/AUTO DIFF WBC: CPT | Performed by: PHYSICIAN ASSISTANT

## 2019-05-03 PROCEDURE — G8978 MOBILITY CURRENT STATUS: HCPCS

## 2019-05-03 PROCEDURE — 83735 ASSAY OF MAGNESIUM: CPT | Performed by: PHYSICIAN ASSISTANT

## 2019-05-03 PROCEDURE — G8988 SELF CARE GOAL STATUS: HCPCS

## 2019-05-03 PROCEDURE — 99220 PR INITIAL OBSERVATION CARE/DAY 70 MINUTES: CPT | Performed by: INTERNAL MEDICINE

## 2019-05-03 RX ORDER — KETOROLAC TROMETHAMINE 30 MG/ML
15 INJECTION, SOLUTION INTRAMUSCULAR; INTRAVENOUS ONCE
Status: COMPLETED | OUTPATIENT
Start: 2019-05-03 | End: 2019-05-03

## 2019-05-03 RX ORDER — LABETALOL 20 MG/4 ML (5 MG/ML) INTRAVENOUS SYRINGE
10 EVERY 4 HOURS PRN
Status: DISCONTINUED | OUTPATIENT
Start: 2019-05-03 | End: 2019-05-05 | Stop reason: HOSPADM

## 2019-05-03 RX ORDER — SODIUM CHLORIDE 9 MG/ML
125 INJECTION, SOLUTION INTRAVENOUS CONTINUOUS
Status: DISPENSED | OUTPATIENT
Start: 2019-05-03 | End: 2019-05-03

## 2019-05-03 RX ORDER — ONDANSETRON 2 MG/ML
4 INJECTION INTRAMUSCULAR; INTRAVENOUS EVERY 6 HOURS PRN
Status: DISCONTINUED | OUTPATIENT
Start: 2019-05-03 | End: 2019-05-05 | Stop reason: HOSPADM

## 2019-05-03 RX ORDER — LOSARTAN POTASSIUM 25 MG/1
25 TABLET ORAL DAILY
Status: DISCONTINUED | OUTPATIENT
Start: 2019-05-03 | End: 2019-05-05 | Stop reason: HOSPADM

## 2019-05-03 RX ORDER — EZETIMIBE 10 MG/1
10 TABLET ORAL
Status: DISCONTINUED | OUTPATIENT
Start: 2019-05-03 | End: 2019-05-05 | Stop reason: HOSPADM

## 2019-05-03 RX ORDER — DIAZEPAM 5 MG/ML
2.5 INJECTION, SOLUTION INTRAMUSCULAR; INTRAVENOUS ONCE
Status: COMPLETED | OUTPATIENT
Start: 2019-05-03 | End: 2019-05-03

## 2019-05-03 RX ORDER — METHOCARBAMOL 750 MG/1
750 TABLET, FILM COATED ORAL EVERY 8 HOURS SCHEDULED
Status: DISCONTINUED | OUTPATIENT
Start: 2019-05-03 | End: 2019-05-04

## 2019-05-03 RX ORDER — LIDOCAINE 50 MG/G
2 PATCH TOPICAL DAILY
Status: DISCONTINUED | OUTPATIENT
Start: 2019-05-03 | End: 2019-05-05 | Stop reason: HOSPADM

## 2019-05-03 RX ORDER — KETOROLAC TROMETHAMINE 30 MG/ML
15 INJECTION, SOLUTION INTRAMUSCULAR; INTRAVENOUS EVERY 6 HOURS PRN
Status: DISCONTINUED | OUTPATIENT
Start: 2019-05-03 | End: 2019-05-04

## 2019-05-03 RX ORDER — ACETAMINOPHEN 325 MG/1
650 TABLET ORAL EVERY 6 HOURS PRN
Status: DISCONTINUED | OUTPATIENT
Start: 2019-05-03 | End: 2019-05-05 | Stop reason: HOSPADM

## 2019-05-03 RX ORDER — DIAZEPAM 5 MG/ML
5 INJECTION, SOLUTION INTRAMUSCULAR; INTRAVENOUS ONCE
Status: DISCONTINUED | OUTPATIENT
Start: 2019-05-03 | End: 2019-05-05 | Stop reason: HOSPADM

## 2019-05-03 RX ADMIN — KETOROLAC TROMETHAMINE 15 MG: 30 INJECTION, SOLUTION INTRAMUSCULAR at 03:10

## 2019-05-03 RX ADMIN — MORPHINE SULFATE 2 MG: 2 INJECTION, SOLUTION INTRAMUSCULAR; INTRAVENOUS at 22:19

## 2019-05-03 RX ADMIN — KETOROLAC TROMETHAMINE 15 MG: 30 INJECTION, SOLUTION INTRAMUSCULAR; INTRAVENOUS at 01:48

## 2019-05-03 RX ADMIN — SODIUM CHLORIDE 125 ML/HR: 0.9 INJECTION, SOLUTION INTRAVENOUS at 14:27

## 2019-05-03 RX ADMIN — DIAZEPAM 2.5 MG: 5 INJECTION, SOLUTION INTRAMUSCULAR; INTRAVENOUS at 01:48

## 2019-05-03 RX ADMIN — METHOCARBAMOL TABLETS 750 MG: 750 TABLET, COATED ORAL at 14:27

## 2019-05-03 RX ADMIN — KETOROLAC TROMETHAMINE 15 MG: 30 INJECTION, SOLUTION INTRAMUSCULAR at 12:06

## 2019-05-03 RX ADMIN — LIDOCAINE 1 PATCH: 50 PATCH TOPICAL at 06:10

## 2019-05-03 RX ADMIN — MORPHINE SULFATE 2 MG: 2 INJECTION, SOLUTION INTRAMUSCULAR; INTRAVENOUS at 06:15

## 2019-05-03 RX ADMIN — ENOXAPARIN SODIUM 40 MG: 40 INJECTION SUBCUTANEOUS at 08:14

## 2019-05-03 RX ADMIN — LOSARTAN POTASSIUM 25 MG: 25 TABLET, FILM COATED ORAL at 08:14

## 2019-05-03 RX ADMIN — METHOCARBAMOL TABLETS 750 MG: 750 TABLET, COATED ORAL at 22:12

## 2019-05-03 RX ADMIN — IOHEXOL 100 ML: 350 INJECTION, SOLUTION INTRAVENOUS at 00:43

## 2019-05-04 ENCOUNTER — APPOINTMENT (OUTPATIENT)
Dept: MRI IMAGING | Facility: HOSPITAL | Age: 74
End: 2019-05-04
Payer: COMMERCIAL

## 2019-05-04 PROBLEM — M54.50 ACUTE RIGHT-SIDED LOW BACK PAIN WITHOUT SCIATICA: Status: ACTIVE | Noted: 2019-05-04

## 2019-05-04 LAB
ANION GAP SERPL CALCULATED.3IONS-SCNC: 11 MMOL/L (ref 4–13)
APTT PPP: 29 SECONDS (ref 26–38)
BUN SERPL-MCNC: 21 MG/DL (ref 5–25)
CALCIUM SERPL-MCNC: 9.1 MG/DL (ref 8.3–10.1)
CHLORIDE SERPL-SCNC: 107 MMOL/L (ref 100–108)
CO2 SERPL-SCNC: 23 MMOL/L (ref 21–32)
CREAT SERPL-MCNC: 0.94 MG/DL (ref 0.6–1.3)
GFR SERPL CREATININE-BSD FRML MDRD: 81 ML/MIN/1.73SQ M
GLUCOSE SERPL-MCNC: 91 MG/DL (ref 65–140)
INR PPP: 1.03 (ref 0.86–1.17)
PLATELET # BLD AUTO: 197 THOUSANDS/UL (ref 149–390)
PMV BLD AUTO: 9.9 FL (ref 8.9–12.7)
POTASSIUM SERPL-SCNC: 4.1 MMOL/L (ref 3.5–5.3)
PROTHROMBIN TIME: 13.2 SECONDS (ref 11.8–14.2)
SODIUM SERPL-SCNC: 141 MMOL/L (ref 136–145)

## 2019-05-04 PROCEDURE — 99225 PR SBSQ OBSERVATION CARE/DAY 25 MINUTES: CPT | Performed by: INTERNAL MEDICINE

## 2019-05-04 PROCEDURE — 80048 BASIC METABOLIC PNL TOTAL CA: CPT | Performed by: INTERNAL MEDICINE

## 2019-05-04 PROCEDURE — 72148 MRI LUMBAR SPINE W/O DYE: CPT

## 2019-05-04 PROCEDURE — 85610 PROTHROMBIN TIME: CPT | Performed by: PHYSICIAN ASSISTANT

## 2019-05-04 PROCEDURE — 85049 AUTOMATED PLATELET COUNT: CPT | Performed by: PHYSICIAN ASSISTANT

## 2019-05-04 PROCEDURE — 85730 THROMBOPLASTIN TIME PARTIAL: CPT | Performed by: PHYSICIAN ASSISTANT

## 2019-05-04 RX ORDER — KETOROLAC TROMETHAMINE 30 MG/ML
15 INJECTION, SOLUTION INTRAMUSCULAR; INTRAVENOUS EVERY 6 HOURS PRN
Status: DISCONTINUED | OUTPATIENT
Start: 2019-05-04 | End: 2019-05-05 | Stop reason: HOSPADM

## 2019-05-04 RX ORDER — POLYETHYLENE GLYCOL 3350 17 G/17G
17 POWDER, FOR SOLUTION ORAL DAILY
Status: DISCONTINUED | OUTPATIENT
Start: 2019-05-04 | End: 2019-05-05 | Stop reason: HOSPADM

## 2019-05-04 RX ORDER — CYCLOBENZAPRINE HCL 10 MG
10 TABLET ORAL 3 TIMES DAILY
Status: DISCONTINUED | OUTPATIENT
Start: 2019-05-04 | End: 2019-05-05 | Stop reason: HOSPADM

## 2019-05-04 RX ORDER — AMOXICILLIN 250 MG
1 CAPSULE ORAL
Status: DISCONTINUED | OUTPATIENT
Start: 2019-05-04 | End: 2019-05-05 | Stop reason: HOSPADM

## 2019-05-04 RX ADMIN — DOCUSATE SODIUM AND SENNA 1 TABLET: 50; 8.6 TABLET, FILM COATED ORAL at 21:20

## 2019-05-04 RX ADMIN — LOSARTAN POTASSIUM 25 MG: 25 TABLET, FILM COATED ORAL at 08:04

## 2019-05-04 RX ADMIN — KETOROLAC TROMETHAMINE 15 MG: 30 INJECTION, SOLUTION INTRAMUSCULAR at 08:04

## 2019-05-04 RX ADMIN — CYCLOBENZAPRINE HYDROCHLORIDE 10 MG: 10 TABLET, FILM COATED ORAL at 20:07

## 2019-05-04 RX ADMIN — KETOROLAC TROMETHAMINE 15 MG: 30 INJECTION, SOLUTION INTRAMUSCULAR at 21:13

## 2019-05-04 RX ADMIN — CYCLOBENZAPRINE HYDROCHLORIDE 10 MG: 10 TABLET, FILM COATED ORAL at 10:34

## 2019-05-04 RX ADMIN — EZETIMIBE 10 MG: 10 TABLET ORAL at 08:04

## 2019-05-04 RX ADMIN — ENOXAPARIN SODIUM 40 MG: 40 INJECTION SUBCUTANEOUS at 08:04

## 2019-05-04 RX ADMIN — POLYETHYLENE GLYCOL 3350 17 G: 17 POWDER, FOR SOLUTION ORAL at 10:34

## 2019-05-04 RX ADMIN — CYCLOBENZAPRINE HYDROCHLORIDE 10 MG: 10 TABLET, FILM COATED ORAL at 15:30

## 2019-05-04 RX ADMIN — METHOCARBAMOL TABLETS 750 MG: 750 TABLET, COATED ORAL at 05:59

## 2019-05-05 VITALS
RESPIRATION RATE: 18 BRPM | BODY MASS INDEX: 31.23 KG/M2 | SYSTOLIC BLOOD PRESSURE: 150 MMHG | OXYGEN SATURATION: 97 % | HEIGHT: 68 IN | TEMPERATURE: 97.7 F | HEART RATE: 70 BPM | DIASTOLIC BLOOD PRESSURE: 70 MMHG

## 2019-05-05 PROBLEM — S09.90XA HEAD INJURY: Status: RESOLVED | Noted: 2019-05-03 | Resolved: 2019-05-05

## 2019-05-05 LAB
ANION GAP SERPL CALCULATED.3IONS-SCNC: 9 MMOL/L (ref 4–13)
BUN SERPL-MCNC: 20 MG/DL (ref 5–25)
CALCIUM SERPL-MCNC: 8.9 MG/DL (ref 8.3–10.1)
CHLORIDE SERPL-SCNC: 108 MMOL/L (ref 100–108)
CO2 SERPL-SCNC: 26 MMOL/L (ref 21–32)
CREAT SERPL-MCNC: 1.11 MG/DL (ref 0.6–1.3)
ERYTHROCYTE [DISTWIDTH] IN BLOOD BY AUTOMATED COUNT: 12.5 % (ref 11.6–15.1)
GFR SERPL CREATININE-BSD FRML MDRD: 66 ML/MIN/1.73SQ M
GLUCOSE SERPL-MCNC: 92 MG/DL (ref 65–140)
HCT VFR BLD AUTO: 39.7 % (ref 36.5–49.3)
HGB BLD-MCNC: 13.2 G/DL (ref 12–17)
MCH RBC QN AUTO: 32.4 PG (ref 26.8–34.3)
MCHC RBC AUTO-ENTMCNC: 33.2 G/DL (ref 31.4–37.4)
MCV RBC AUTO: 97 FL (ref 82–98)
PLATELET # BLD AUTO: 184 THOUSANDS/UL (ref 149–390)
PMV BLD AUTO: 9.8 FL (ref 8.9–12.7)
POTASSIUM SERPL-SCNC: 4.5 MMOL/L (ref 3.5–5.3)
RBC # BLD AUTO: 4.08 MILLION/UL (ref 3.88–5.62)
SODIUM SERPL-SCNC: 143 MMOL/L (ref 136–145)
WBC # BLD AUTO: 6.5 THOUSAND/UL (ref 4.31–10.16)

## 2019-05-05 PROCEDURE — 85027 COMPLETE CBC AUTOMATED: CPT | Performed by: INTERNAL MEDICINE

## 2019-05-05 PROCEDURE — 99217 PR OBSERVATION CARE DISCHARGE MANAGEMENT: CPT | Performed by: INTERNAL MEDICINE

## 2019-05-05 PROCEDURE — 80048 BASIC METABOLIC PNL TOTAL CA: CPT | Performed by: INTERNAL MEDICINE

## 2019-05-05 PROCEDURE — 97116 GAIT TRAINING THERAPY: CPT

## 2019-05-05 RX ORDER — CYCLOBENZAPRINE HCL 10 MG
10 TABLET ORAL 3 TIMES DAILY
Qty: 21 TABLET | Refills: 0 | Status: SHIPPED | OUTPATIENT
Start: 2019-05-05

## 2019-05-05 RX ORDER — POLYETHYLENE GLYCOL 3350 17 G/17G
17 POWDER, FOR SOLUTION ORAL DAILY
Qty: 14 EACH | Refills: 0 | Status: SHIPPED | OUTPATIENT
Start: 2019-05-06

## 2019-05-05 RX ORDER — AMOXICILLIN 250 MG
1 CAPSULE ORAL
Qty: 30 TABLET | Refills: 0 | Status: SHIPPED | OUTPATIENT
Start: 2019-05-05

## 2019-05-05 RX ORDER — OXYCODONE HYDROCHLORIDE 5 MG/1
5 TABLET ORAL EVERY 4 HOURS PRN
Qty: 30 TABLET | Refills: 0 | Status: SHIPPED | OUTPATIENT
Start: 2019-05-05 | End: 2019-05-15

## 2019-05-05 RX ORDER — FAMOTIDINE 20 MG/1
20 TABLET, FILM COATED ORAL 2 TIMES DAILY
Qty: 10 TABLET | Refills: 0 | Status: SHIPPED | OUTPATIENT
Start: 2019-05-05 | End: 2019-05-10

## 2019-05-05 RX ORDER — IBUPROFEN 400 MG/1
400 TABLET ORAL EVERY 8 HOURS SCHEDULED
Qty: 9 TABLET | Refills: 0 | Status: SHIPPED | OUTPATIENT
Start: 2019-05-05 | End: 2019-05-08

## 2019-05-05 RX ADMIN — CYCLOBENZAPRINE HYDROCHLORIDE 10 MG: 10 TABLET, FILM COATED ORAL at 16:08

## 2019-05-05 RX ADMIN — CYCLOBENZAPRINE HYDROCHLORIDE 10 MG: 10 TABLET, FILM COATED ORAL at 08:03

## 2019-05-05 RX ADMIN — ENOXAPARIN SODIUM 40 MG: 40 INJECTION SUBCUTANEOUS at 08:03

## 2019-05-05 RX ADMIN — POLYETHYLENE GLYCOL 3350 17 G: 17 POWDER, FOR SOLUTION ORAL at 08:03

## 2019-05-05 RX ADMIN — EZETIMIBE 10 MG: 10 TABLET ORAL at 08:03

## 2019-05-05 RX ADMIN — LOSARTAN POTASSIUM 25 MG: 25 TABLET, FILM COATED ORAL at 08:03

## 2021-02-24 ENCOUNTER — IMMUNIZATIONS (OUTPATIENT)
Dept: FAMILY MEDICINE CLINIC | Facility: HOSPITAL | Age: 76
End: 2021-02-24

## 2021-02-24 DIAGNOSIS — Z23 ENCOUNTER FOR IMMUNIZATION: Primary | ICD-10-CM

## 2021-02-24 PROCEDURE — 0001A SARS-COV-2 / COVID-19 MRNA VACCINE (PFIZER-BIONTECH) 30 MCG: CPT

## 2021-02-24 PROCEDURE — 91300 SARS-COV-2 / COVID-19 MRNA VACCINE (PFIZER-BIONTECH) 30 MCG: CPT

## 2021-03-15 ENCOUNTER — IMMUNIZATIONS (OUTPATIENT)
Dept: FAMILY MEDICINE CLINIC | Facility: HOSPITAL | Age: 76
End: 2021-03-15

## 2021-03-15 DIAGNOSIS — Z23 ENCOUNTER FOR IMMUNIZATION: Primary | ICD-10-CM

## 2021-03-15 PROCEDURE — 0002A SARS-COV-2 / COVID-19 MRNA VACCINE (PFIZER-BIONTECH) 30 MCG: CPT

## 2021-03-15 PROCEDURE — 91300 SARS-COV-2 / COVID-19 MRNA VACCINE (PFIZER-BIONTECH) 30 MCG: CPT

## 2021-04-09 ENCOUNTER — TRANSCRIBE ORDERS (OUTPATIENT)
Dept: ADMINISTRATIVE | Facility: HOSPITAL | Age: 76
End: 2021-04-09

## 2021-04-09 DIAGNOSIS — G89.29 OTHER CHRONIC PAIN: ICD-10-CM

## 2021-04-09 DIAGNOSIS — D47.2 MONOCLONAL GAMMOPATHY: ICD-10-CM

## 2021-04-09 DIAGNOSIS — M54.41 LUMBAGO WITH SCIATICA, RIGHT SIDE: Primary | ICD-10-CM

## 2021-04-27 ENCOUNTER — HOSPITAL ENCOUNTER (OUTPATIENT)
Dept: RADIOLOGY | Age: 76
Discharge: HOME | End: 2021-04-27
Payer: COMMERCIAL

## 2021-04-27 DIAGNOSIS — D47.2 MONOCLONAL GAMMOPATHY: ICD-10-CM

## 2021-04-27 DIAGNOSIS — G89.29 OTHER CHRONIC PAIN: ICD-10-CM

## 2021-04-27 DIAGNOSIS — M54.41 LUMBAGO WITH SCIATICA, RIGHT SIDE: ICD-10-CM

## 2021-04-27 PROCEDURE — 72158 MRI LUMBAR SPINE W/O & W/DYE: CPT

## 2021-04-27 PROCEDURE — G1004 CDSM NDSC: HCPCS

## 2021-04-27 PROCEDURE — A9585 GADOBUTROL INJECTION: HCPCS | Performed by: INTERNAL MEDICINE

## 2021-04-27 RX ADMIN — GADOBUTROL 9 ML: 604.72 INJECTION INTRAVENOUS at 15:31

## 2022-02-18 ENCOUNTER — IMMUNIZATIONS (OUTPATIENT)
Dept: FAMILY MEDICINE CLINIC | Facility: HOSPITAL | Age: 77
End: 2022-02-18

## 2022-02-18 PROCEDURE — 0054A COVID-19 PFIZER VACC TRIS-SUCROSE GRAY CAP 0.3 ML: CPT

## 2022-02-18 PROCEDURE — 91305 COVID-19 PFIZER VACC TRIS-SUCROSE GRAY CAP 0.3 ML: CPT

## 2023-08-27 ENCOUNTER — APPOINTMENT (EMERGENCY)
Dept: RADIOLOGY | Facility: HOSPITAL | Age: 78
End: 2023-08-27
Payer: COMMERCIAL

## 2023-08-27 ENCOUNTER — HOSPITAL ENCOUNTER (EMERGENCY)
Facility: HOSPITAL | Age: 78
Discharge: HOME | End: 2023-08-27
Attending: EMERGENCY MEDICINE | Admitting: EMERGENCY MEDICINE
Payer: COMMERCIAL

## 2023-08-27 VITALS
SYSTOLIC BLOOD PRESSURE: 179 MMHG | RESPIRATION RATE: 16 BRPM | DIASTOLIC BLOOD PRESSURE: 78 MMHG | WEIGHT: 206.13 LBS | OXYGEN SATURATION: 99 % | HEART RATE: 56 BPM | TEMPERATURE: 98.3 F | BODY MASS INDEX: 31.34 KG/M2

## 2023-08-27 DIAGNOSIS — S92.345A NONDISPLACED FRACTURE OF FOURTH METATARSAL BONE, LEFT FOOT, INITIAL ENCOUNTER FOR CLOSED FRACTURE: Primary | ICD-10-CM

## 2023-08-27 PROCEDURE — 73630 X-RAY EXAM OF FOOT: CPT

## 2023-08-27 PROCEDURE — 99283 EMERGENCY DEPT VISIT LOW MDM: CPT

## 2023-08-27 PROCEDURE — 73610 X-RAY EXAM OF ANKLE: CPT

## 2023-08-27 PROCEDURE — 99284 EMERGENCY DEPT VISIT MOD MDM: CPT | Performed by: EMERGENCY MEDICINE

## 2023-08-27 NOTE — ED ATTENDING ATTESTATION
8/27/2023  I, Estella Berry DO, saw and evaluated the patient. I have discussed the patient with the resident/non-physician practitioner and agree with the resident's/non-physician practitioner's findings, Plan of Care, and MDM as documented in the resident's/non-physician practitioner's note, except where noted. All available labs and Radiology studies were reviewed. I was present for key portions of any procedure(s) performed by the resident/non-physician practitioner and I was immediately available to provide assistance. At this point I agree with the current assessment done in the Emergency Department.   I have conducted an independent evaluation of this patient a history and physical is as follows:78y M here for evaluation of ankle injury    ED Course         Critical Care Time  Procedures

## 2023-08-28 NOTE — ED PROVIDER NOTES
History  Chief Complaint   Patient presents with   • Ankle Injury     Pt  reports tripped over brnach and twisted left ankle, denies headstrike, happened earlier today     70 yo M coming into the ED for eval of left foot pain after he tripped over a branch. He was trimming trees outside, stepped awkwardly and twisted his foot, tripping. He felt a crack in his foot, and has had pain and swelling to the top of his mid foot ever since. Denies head strike or LOC. Denies any other complaints. History provided by:  Patient   used: No    Ankle Injury      Prior to Admission Medications   Prescriptions Last Dose Informant Patient Reported? Taking?    Bioflavonoid Products (JORDI-C) TABS   Yes No   Sig: Take 1 tablet by mouth daily   Coenzyme Q10 (COQ-10 PO)   Yes No   Sig: Take 1 tablet by mouth daily   Flaxseed, Linseed, (FLAXSEED OIL) 1000 MG CAPS   Yes No   Sig: Take 1,000 mg by mouth daily   Lecithin 1200 MG CAPS   Yes No   Sig: Take 1,200 mg by mouth daily   Zinc Picolinate 25 MG TABS   Yes No   Sig: Take 25 mg by mouth daily   cyclobenzaprine (FLEXERIL) 10 mg tablet   No No   Sig: Take 1 tablet (10 mg total) by mouth 3 (three) times a day   ezetimibe (ZETIA) 10 mg tablet   Yes No   Sig: Take by mouth   famotidine (PEPCID) 20 mg tablet   No No   Sig: Take 1 tablet (20 mg total) by mouth 2 (two) times a day for 5 days   glucosamine-chondroitin 500-400 MG tablet   Yes No   Sig: Take 1 tablet by mouth daily   ibuprofen (MOTRIN) 400 mg tablet   No No   Sig: Take 1 tablet (400 mg total) by mouth every 8 (eight) hours for 3 days   losartan (COZAAR) 25 mg tablet  Self Yes No   Sig: Take 25 mg by mouth daily   polyethylene glycol (MIRALAX) 17 g packet   No No   Sig: Take 17 g by mouth daily   rosuvastatin (CRESTOR) 5 mg tablet   Yes No   Sig: Take by mouth   senna-docusate sodium (SENOKOT S) 8.6-50 mg per tablet   No No   Sig: Take 1 tablet by mouth daily at bedtime      Facility-Administered Medications: None       Past Medical History:   Diagnosis Date   • Hyperlipidemia    • Hypertension        History reviewed. No pertinent surgical history. Family History   Problem Relation Age of Onset   • No Known Problems Mother    • No Known Problems Father      I have reviewed and agree with the history as documented. E-Cigarette/Vaping     E-Cigarette/Vaping Substances     Social History     Tobacco Use   • Smoking status: Former   • Smokeless tobacco: Former   Substance Use Topics   • Alcohol use: No   • Drug use: No       Review of Systems   All other systems reviewed and are negative. Physical Exam  Physical Exam  Vitals and nursing note reviewed. Constitutional:       General: He is not in acute distress. Appearance: Normal appearance. He is normal weight. HENT:      Head: Normocephalic and atraumatic. Right Ear: External ear normal.      Left Ear: External ear normal.      Nose: Nose normal. No congestion or rhinorrhea. Mouth/Throat:      Mouth: Mucous membranes are moist.      Pharynx: Oropharynx is clear. Eyes:      General: No scleral icterus. Right eye: No discharge. Left eye: No discharge. Conjunctiva/sclera: Conjunctivae normal.   Cardiovascular:      Rate and Rhythm: Normal rate. Pulses: Normal pulses. Pulmonary:      Effort: Pulmonary effort is normal. No respiratory distress. Abdominal:      General: Abdomen is flat. Palpations: Abdomen is soft. Tenderness: There is no abdominal tenderness. Musculoskeletal:         General: Swelling present. Normal range of motion. Cervical back: Normal range of motion. Comments: Left foot - ttp to the dorsal mid foot, overlying 3rd-4th metatarsals. No deformity. NVI distally. No ttp or swelling to ankle joint, normal ankle ROM. Skin:     General: Skin is warm and dry. Capillary Refill: Capillary refill takes less than 2 seconds.    Neurological:      General: No focal deficit present. Mental Status: He is alert and oriented to person, place, and time. Mental status is at baseline. Psychiatric:         Mood and Affect: Mood normal.         Behavior: Behavior normal.         Vital Signs  ED Triage Vitals   Temperature Pulse Respirations Blood Pressure SpO2   08/27/23 1550 08/27/23 1549 08/27/23 1549 08/27/23 1549 08/27/23 1549   98.3 °F (36.8 °C) 56 16 (!) 179/78 99 %      Temp Source Heart Rate Source Patient Position - Orthostatic VS BP Location FiO2 (%)   08/27/23 1550 08/27/23 1549 08/27/23 1549 08/27/23 1549 --   Oral Monitor Sitting Right arm       Pain Score       08/27/23 1549       2           Vitals:    08/27/23 1549   BP: (!) 179/78   Pulse: 56   Patient Position - Orthostatic VS: Sitting         Visual Acuity      ED Medications  Medications - No data to display    Diagnostic Studies  Results Reviewed     None                 XR ankle 3+ views LEFT   Final Result by Trevon Aceves MD (08/28 0827)      No acute osseous abnormality. Workstation performed: [id number]         XR foot 3+ views LEFT   ED Interpretation by Bethany Shell DO (08/27 1635)   Nondisplaced fracture to the 4th metacarpal, proximal shaft      Final Result by Trevon Aceves MD (08/28 7275)   Acute transverse nondisplaced proximal fourth metatarsal fracture         Workstation performed: [id number]                    Procedures  Procedures     Patient was placed in a left surgical shoe. Ambulated with crutches. Tolerated well. NVI. Stable for d/c home. ED Course                                             Medical Decision Making  70 yo M w/ L foot injury after twisting it. + ttp and swelling to midfoot on exam. Xray to r/o fx/dislocation - revealed nondisplaced fx to the left 4th metatarsal. Stable for d/c home in surgical shoe, flat soled, walker provided, WBAT. F/u podiatry or ortho foot/ankle.     Nondisplaced fracture of fourth metatarsal bone, left foot, initial encounter for closed fracture: acute illness or injury  Amount and/or Complexity of Data Reviewed  Radiology: ordered and independent interpretation performed. Decision-making details documented in ED Course. Disposition  Final diagnoses:   Nondisplaced fracture of fourth metatarsal bone, left foot, initial encounter for closed fracture     Time reflects when diagnosis was documented in both MDM as applicable and the Disposition within this note     Time User Action Codes Description Comment    8/27/2023  4:36 PM Cayla Alexander Add [S62.355A] Closed nondisplaced fracture of shaft of fourth metacarpal bone of left hand, initial encounter     8/27/2023  4:36 PM Sergey Bullock Remove [S62.355A] Closed nondisplaced fracture of shaft of fourth metacarpal bone of left hand, initial encounter     8/27/2023  4:36 PM Cayla Momence Add [S92.345A] Nondisplaced fracture of fourth metatarsal bone, left foot, initial encounter for closed fracture       ED Disposition     ED Disposition   Discharge    Condition   Stable    Date/Time   Sun Aug 27, 2023  4:35 PM    Comment   Jesús Ortiz discharge to home/self care.                Follow-up Information     Follow up With Specialties Details Why Contact Info    Mirian Bai [facility]  podiatry (foot doctor) [address] [facility]  [phone]      Yosi Gallardo MD Orthopedic Surgery  foot and ankle orthopedics [address] [facility]  [address]  [address] [place] [address]  [phone]            Discharge Medication List as of 8/27/2023  4:37 PM      CONTINUE these medications which have NOT CHANGED    Details   Bioflavonoid Products (JORDI-C) TABS Take 1 tablet by mouth daily, Historical Med      Coenzyme Q10 (COQ-10 PO) Take 1 tablet by mouth daily, Historical Med      cyclobenzaprine (FLEXERIL) 10 mg tablet Take 1 tablet (10 mg total) by mouth 3 (three) times a day, Starting Sun 5/5/2019, Normal      ezetimibe (ZETIA) 10 mg tablet Take by mouth, Historical Med famotidine (PEPCID) 20 mg tablet Take 1 tablet (20 mg total) by mouth 2 (two) times a day for 5 days, Starting Sun 5/5/2019, Until Fri 5/10/2019, Normal      Flaxseed, Linseed, (FLAXSEED OIL) 1000 MG CAPS Take 1,000 mg by mouth daily, Historical Med      glucosamine-chondroitin 500-400 MG tablet Take 1 tablet by mouth daily, Historical Med      ibuprofen (MOTRIN) 400 mg tablet Take 1 tablet (400 mg total) by mouth every 8 (eight) hours for 3 days, Starting Sun 5/5/2019, Until Wed 5/8/2019, Normal      Lecithin 1200 MG CAPS Take 1,200 mg by mouth daily, Historical Med      losartan (COZAAR) 25 mg tablet Take 25 mg by mouth daily, Historical Med      polyethylene glycol (MIRALAX) 17 g packet Take 17 g by mouth daily, Starting Mon 5/6/2019, Normal      rosuvastatin (CRESTOR) 5 mg tablet Take by mouth, Historical Med      senna-docusate sodium (SENOKOT S) 8.6-50 mg per tablet Take 1 tablet by mouth daily at bedtime, Starting Sun 5/5/2019, Normal      Zinc Picolinate 25 MG TABS Take 25 mg by mouth daily, Historical Med             No discharge procedures on file.     PDMP Review     None          ED Provider  Electronically Signed by           Kana Zee DO  08/28/23 1038

## 2023-08-31 ENCOUNTER — OFFICE VISIT (OUTPATIENT)
Dept: OBGYN CLINIC | Facility: CLINIC | Age: 78
End: 2023-08-31
Payer: COMMERCIAL

## 2023-08-31 VITALS
HEIGHT: 68 IN | WEIGHT: 206 LBS | HEART RATE: 60 BPM | SYSTOLIC BLOOD PRESSURE: 184 MMHG | DIASTOLIC BLOOD PRESSURE: 85 MMHG | BODY MASS INDEX: 31.22 KG/M2

## 2023-08-31 DIAGNOSIS — S92.345A CLOSED NONDISPLACED FRACTURE OF FOURTH METATARSAL BONE OF LEFT FOOT, INITIAL ENCOUNTER: ICD-10-CM

## 2023-08-31 DIAGNOSIS — M79.672 PAIN IN LEFT FOOT: Primary | ICD-10-CM

## 2023-08-31 PROCEDURE — 99203 OFFICE O/P NEW LOW 30 MIN: CPT | Performed by: PHYSICIAN ASSISTANT

## 2023-08-31 NOTE — PROGRESS NOTES
Orthopaedic Surgery - Office Note  Isadora Cifuentse (64 y.o. male)   : 1945   MRN: [id number]  Encounter Date: 2023    Chief Complaint   Patient presents with   • Left Foot - Pain         Assessment/Plan  Diagnoses and all orders for this visit:    Pain in left foot    Closed nondisplaced fracture of fourth metatarsal bone of left foot, initial encounter    The diagnosis as well as treatment options were reviewed with the patient in the office today. He was advised this fracture should not require surgical intervention but would benefit from protection using the postop shoe or hard soled shoe. He may continue the walker for safety until he can weight-bear as tolerated without pain. Until he is ambulating as prior to injury, he should use aspirin 81 mg twice daily for DVT prophylaxis unless otherwise contraindicated. He may ice the foot for comfort 20 minutes on 1 hour off 3 times a day. The importance of elevating the foot above the level of the heart was reviewed at length to help with the soft tissue swelling. I encouraged the patient to be careful with the icing due to his reported history of peripheral neuropathy. Return for Recheck in 3 to 4 weeks with podiatry. History of Present Illness  This is a new patient who injured his left foot on 2023. He was outside trimming trees whenever he awkwardly twisted his left foot and tripped. He felt a crack at that time and had immediate pain in the foot. He was seen at the emergency department where he had x-rays of the foot and ankle. He was found to have a nondisplaced fourth metatarsal fracture and left ankle osteoarthritis. He was placed in a left postop surgical shoe and given walker. Patient reports he is tolerating the walker and postop shoe well with minimal discomfort. He has not been elevating the foot very much. He states he has peripheral neuropathy. He denies diabetic history.     Review of Systems  Pertinent items are noted in HPI. All other systems were reviewed and are negative. Physical Exam  There were no vitals taken for this visit. Cons: Appears well. No apparent distress. Psych: Alert. Oriented x3. Mood and affect normal.  Patient's left foot is with resolving ecchymosis in the plantar surface of the distal foot as well as some of the small toes. He is neurovascularly intact with sensation intact to light touch in the dorsum of the foot. Dorsalis pedis and posterior tibial pulses are +1 and symmetric. He has soft tissue edema in the dorsum of the left foot. He is nontender at the fifth metatarsal.  He is nontender throughout the ankle. He has mild tenderness over the fourth metatarsal.  He is nontender throughout the medial foot.   Gait is heel-to-toe with the assistance of a rolling walker           Studies Reviewed  Study Result    Narrative & Impression   LEFT FOOT     INDICATION:   fall left foot pain and swelling.     COMPARISON:  None     VIEWS:  XR FOOT 3+ VW LEFT  Images: 3     FINDINGS:     Acute transverse nondisplaced fracture is present at the level of the proximal shaft of the fourth metatarsal     No significant degenerative changes.     No lytic or blastic osseous lesion.     Soft tissues are unremarkable.     IMPRESSION:  Acute transverse nondisplaced proximal fourth metatarsal fracture        Workstation performed: [id number]      Study Result    Narrative & Impression   LEFT ANKLE     INDICATION:   fall left ankle pain.     COMPARISON:  None     VIEWS:  XR ANKLE 3+ VW LEFT  Images: 3     FINDINGS:  Mildly irregular contour of the medial malleolus which may reflect evidence of old healed fracture     There is no acute fracture or dislocation.     Mild tibiotalar degenerative changes     No lytic or blastic osseous lesion.     Soft tissues are unremarkable.     IMPRESSION:     No acute osseous abnormality.        X-ray images as well as reports were reviewed by myself in the office today and I agree with radiologist interpretation. Emergency department notes from 8/27/2023 were reviewed by myself in the office today. Procedures  No procedures today. Medical, Surgical, Family, and Social History  The patient's medical history, family history, and social history, were reviewed and updated as appropriate. Past Medical History:   Diagnosis Date   • Hyperlipidemia    • Hypertension        History reviewed. No pertinent surgical history.     Family History   Problem Relation Age of Onset   • No Known Problems Mother    • No Known Problems Father        Social History     Occupational History   • Not on file   Tobacco Use   • Smoking status: Former   • Smokeless tobacco: Former   Substance and Sexual Activity   • Alcohol use: No   • Drug use: No   • Sexual activity: Not on file       Allergies   Allergen Reactions   • Amoxicillin    • Atorvastatin GI Intolerance   • Cerivastatin GI Intolerance   • Colchicine Diarrhea   • Fluvastatin      DIZZINESS   • Pitavastatin Myalgia   • Statins Myalgia     Okay with Crestor   • Verapamil      DIZZINESS         Current Outpatient Medications:   •  Bioflavonoid Products (JORDI-C) TABS, Take 1 tablet by mouth daily, Disp: , Rfl:   •  Coenzyme Q10 (COQ-10 PO), Take 1 tablet by mouth daily, Disp: , Rfl:   •  cyclobenzaprine (FLEXERIL) 10 mg tablet, Take 1 tablet (10 mg total) by mouth 3 (three) times a day, Disp: 21 tablet, Rfl: 0  •  ezetimibe (ZETIA) 10 mg tablet, Take by mouth, Disp: , Rfl:   •  famotidine (PEPCID) 20 mg tablet, Take 1 tablet (20 mg total) by mouth 2 (two) times a day for 5 days, Disp: 10 tablet, Rfl: 0  •  Flaxseed, Linseed, (FLAXSEED OIL) 1000 MG CAPS, Take 1,000 mg by mouth daily, Disp: , Rfl:   •  glucosamine-chondroitin 500-400 MG tablet, Take 1 tablet by mouth daily, Disp: , Rfl:   •  ibuprofen (MOTRIN) 400 mg tablet, Take 1 tablet (400 mg total) by mouth every 8 (eight) hours for 3 days, Disp: 9 tablet, Rfl: 0  •  Lecithin 1200 MG CAPS, Take 1,200 mg by mouth daily, Disp: , Rfl:   •  losartan (COZAAR) 25 mg tablet, Take 25 mg by mouth daily, Disp: , Rfl:   •  polyethylene glycol (MIRALAX) 17 g packet, Take 17 g by mouth daily, Disp: 14 each, Rfl: 0  •  rosuvastatin (CRESTOR) 5 mg tablet, Take by mouth, Disp: , Rfl:   •  senna-docusate sodium (SENOKOT S) 8.6-50 mg per tablet, Take 1 tablet by mouth daily at bedtime, Disp: 30 tablet, Rfl: 0  •  Zinc Picolinate 25 MG TABS, Take 25 mg by mouth daily, Disp: , Rfl:       [employer], PA-C

## 2023-08-31 NOTE — PATIENT INSTRUCTIONS
P. R.I.C.E. Treatment   WHAT YOU NEED TO KNOW:   What is P.R.I.C.E. treatment? P.R.I.C.E. treatment is a 5-step process used to decrease swelling and pain caused by an injury. P.R.I.C.E. stands for protect, rest, ice, compress, and elevate. Start P.R.I.C.E. within 24 to 48 hours of an injury. How do I use P.R.I.C.E. treatment? Protect  your injury from more damage. Support the injured area with a brace or splint. Your healthcare provider will tell you how long to use the brace or splint. Rest  your injured area as directed. You may need to stop using, or keep weight off, the injury for 48 hours or longer. Your healthcare provider may recommend crutches or another device. Return to your usual activities as directed. Apply ice  on your injured area for 15 to 20 minutes every 4 hours or as directed. Use an ice pack, or put crushed ice in a plastic bag. Cover the bag with a towel before you apply it to your skin. Ice helps prevent tissue damage and decreases swelling and pain. Compress  (keep pressure on) the injured area. Compression will help decrease swelling and support the injured area. Use an elastic bandage, air stirrup, splint, or sling as directed. If you use an elastic bandage, make sure the bandage is not too tight. You should be able to slip 2 fingers between the bandage and your skin. Elevate  the injured area above the level of your heart as often as you can. This will help decrease swelling and pain. Prop the injured area on pillows or blankets to keep it elevated comfortably. When should I seek immediate care? Your pain is severe. You have severe swelling or deformity. You have numbness in the injured area. When should I call my doctor? Your pain and swelling do not go away after a few days. You have questions or concerns about your condition or care. CARE AGREEMENT:   You have the right to help plan your care.  Learn about your health condition and how it may be treated. Discuss treatment options with your healthcare providers to decide what care you want to receive. You always have the right to refuse treatment. The above information is an  only. It is not intended as medical advice for individual conditions or treatments. Talk to your doctor, nurse or pharmacist before following any medical regimen to see if it is safe and effective for you. © Copyright [facility] 2022 Information is for End User's use only and may not be sold, redistributed or otherwise used for commercial purposes. Post Surgical Shoe   WHAT YOU NEED TO KNOW:   A post surgical shoe is a medical shoe used to protect the foot and toes after an injury or surgery. It is also called a postop shoe, rigid sole shoe, or hard sole shoe. It looks like on oversized shoe with a flat, hard sole, fabric or mesh sides, and adjustable straps. The shoe is open in the front, where your toes go. The shoe helps change how your foot carries weight. This can help decrease pain and increase movement after an injury or surgery so you can heal.       DISCHARGE INSTRUCTIONS:   Call your doctor if:   You have pain or discomfort that does not go away. You cannot seem to get the shoe to fit correctly. You have questions or concerns about your condition or care. How to put on the post surgical shoe:   Sit down and place your foot comfortably in the shoe. Close the fabric or mesh sides over the top of your foot. Tighten the straps of the shoe so they are snug but not too tight. The shoe should limit movement but not cut off your blood flow. Stand up and take a few steps to practice walking. What else you need to know:   Check your foot and toes often. Check your foot and toes for redness and swelling. If your toes are red, swollen, numb, or tingly, loosen your straps. Over time, the swelling from the injury or surgery will decrease. When this happens, you may need to tighten the straps.     Be careful when you walk on wet surfaces. The shoe may be slippery. Ask about removing the shoe to bathe. Your provider may want you to leave the shoe on when you bathe. Cover it with a plastic bag and tape the bag closed around your leg. Follow up with your doctor as directed:  Write down your questions so you remember to ask them during your visits. © Copyright Martina Comer 2022 Information is for End User's use only and may not be sold, redistributed or otherwise used for commercial purposes. The above information is an  only. It is not intended as medical advice for individual conditions or treatments. Talk to your doctor, nurse or pharmacist before following any medical regimen to see if it is safe and effective for you.

## 2023-09-19 ENCOUNTER — OFFICE VISIT (OUTPATIENT)
Dept: PODIATRY | Facility: CLINIC | Age: 78
End: 2023-09-19
Payer: COMMERCIAL

## 2023-09-19 VITALS
HEIGHT: 68 IN | HEART RATE: 58 BPM | DIASTOLIC BLOOD PRESSURE: 71 MMHG | SYSTOLIC BLOOD PRESSURE: 164 MMHG | WEIGHT: 206 LBS | BODY MASS INDEX: 31.22 KG/M2

## 2023-09-19 DIAGNOSIS — S92.345A CLOSED NONDISPLACED FRACTURE OF FOURTH METATARSAL BONE OF LEFT FOOT, INITIAL ENCOUNTER: Primary | ICD-10-CM

## 2023-09-19 DIAGNOSIS — M79.672 PAIN IN LEFT FOOT: ICD-10-CM

## 2023-09-19 DIAGNOSIS — S93.622A LISFRANC'S SPRAIN, LEFT, INITIAL ENCOUNTER: ICD-10-CM

## 2023-09-19 PROCEDURE — 99203 OFFICE O/P NEW LOW 30 MIN: CPT | Performed by: PODIATRIST

## 2023-09-19 NOTE — LETTER
September 19, 2023     Eugene Velasco, [address] [facility] [place]  [address] [id number]    Patient: Jesús Ortiz   YOB: 1945   Date of Visit: 9/19/2023       Dear Dr. Tani Lazaro: Thank you for referring Armaan Lagos to me for evaluation. Below are my notes for this consultation. If you have questions, please do not hesitate to call me. I look forward to following your patient along with you. Sincerely,        Martin Gr DPM        CC: Russ Simons DPM  9/19/2023  6:14 PM  Addendum                 PATIENT:  Jesús Ortiz  1945       ASSESSMENT:    1. Closed nondisplaced fracture of fourth metatarsal bone of left foot, initial encounter  Ambulatory Referral to [facility] [address] lower extremity wo contrast left      2. Lisfranc's sprain, left, initial encounter  Cam [facility]    CT lower extremity wo contrast left      3. Pain in left foot  Ambulatory Referral to Podiatry               PLAN:  1. Reviewed medical records. Reviewed the note from ED. Reviewed the note from orthopedics. Patient was counseled and educated on the condition and the diagnosis. 2. X-ray was personally reviewed. The radiological findings were discussed with the patient. 3. The diagnosis, treatment options and prognosis were discussed with the patient. 4. He still has significant swelling and pain around left TMTJ. Will order CT scan to rule out Lisfranc's joint injury. 5. Dispensed CAM walker. 6. Resting, elevation, icing, and compression. ACE wrap applied for compression of left foot. 7. Patient will return after the test.        Imaging: I have personally reviewed pertinent films in PACS  Labs, pathology, and Other Studies: I have personally reviewed pertinent reports. Subjective:       HPI  The patient was referred to my office for left foot injury. He tripped over a branch and his left foot twisted about 3 weeks ago.   He had pain and swelling and went to ED.  X- ray showed a fracture. He presents with surgical shoe today. Pain is about 6 out of 10. Still has swelling in left midfoot. No associated numbness or paresthesia. No significant weakness or dysfunction. The following portions of the patient's history were reviewed and updated as appropriate: allergies, current medications, past family history, past medical history, past social history, past surgical history and problem list.  All pertinent labs and images were reviewed. Past Medical History  Past Medical History:   Diagnosis Date   • Hyperlipidemia    • Hypertension        Past Surgical History  History reviewed. No pertinent surgical history.      Allergies:  Amoxicillin, Atorvastatin, Cerivastatin, Colchicine, Fluvastatin, Pitavastatin, Statins, and Verapamil    Medications:  Current Outpatient Medications   Medication Sig Dispense Refill   • Bioflavonoid Products (JORDI-C) TABS Take 1 tablet by mouth daily     • Coenzyme Q10 (COQ-10 PO) Take 1 tablet by mouth daily     • cyclobenzaprine (FLEXERIL) 10 mg tablet Take 1 tablet (10 mg total) by mouth 3 (three) times a day 21 tablet 0   • ezetimibe (ZETIA) 10 mg tablet Take by mouth     • Flaxseed, Linseed, (FLAXSEED OIL) 1000 MG CAPS Take 1,000 mg by mouth daily     • glucosamine-chondroitin 500-400 MG tablet Take 1 tablet by mouth daily     • Lecithin 1200 MG CAPS Take 1,200 mg by mouth daily     • losartan (COZAAR) 25 mg tablet Take 25 mg by mouth daily     • polyethylene glycol (MIRALAX) 17 g packet Take 17 g by mouth daily 14 each 0   • rosuvastatin (CRESTOR) 5 mg tablet Take by mouth     • senna-docusate sodium (SENOKOT S) 8.6-50 mg per tablet Take 1 tablet by mouth daily at bedtime 30 tablet 0   • Zinc Picolinate 25 MG TABS Take 25 mg by mouth daily     • famotidine (PEPCID) 20 mg tablet Take 1 tablet (20 mg total) by mouth 2 (two) times a day for 5 days 10 tablet 0   • ibuprofen (MOTRIN) 400 mg tablet Take 1 tablet (400 mg total) by mouth every 8 (eight) hours for 3 days 9 tablet 0     No current facility-administered medications for this visit. Social History:  Social History     Socioeconomic History   • Marital status: /Civil Union     Spouse name: None   • Number of children: None   • Years of education: None   • Highest education level: None   Occupational History   • None   Tobacco Use   • Smoking status: Former   • Smokeless tobacco: Former   Substance and Sexual Activity   • Alcohol use: No   • Drug use: No   • Sexual activity: None   Other Topics Concern   • None   Social History Narrative   • None     Social Determinants of Health     Financial Resource Strain: Not on file   Food Insecurity: Not on file   Transportation Needs: Not on file   Physical Activity: Not on file   Stress: Not on file   Social Connections: Not on file   Intimate Partner Violence: Not on file   Housing Stability: Not on file          Review of Systems   Constitutional: Negative for chills and fever. Respiratory: Negative for cough and shortness of breath. Cardiovascular: Negative for chest pain. Gastrointestinal: Negative for nausea and vomiting. Musculoskeletal: Positive for joint swelling. Skin: Negative for rash and wound. Allergic/Immunologic: Negative for immunocompromised state. Neurological: Negative for weakness and numbness. Hematological: Negative. Psychiatric/Behavioral: Negative for behavioral problems and confusion. Objective:      /71   Pulse 58   Ht 5' 8" (1.727 m)   Wt 93.4 kg (206 lb)   BMI 31.32 kg/m²         Physical Exam  Vitals reviewed. Constitutional:       General: He is not in acute distress. Appearance: He is not toxic-appearing or diaphoretic. HENT:      Head: Normocephalic and atraumatic. Eyes:      Extraocular Movements: Extraocular movements intact. Cardiovascular:      Rate and Rhythm: Normal rate and regular rhythm. Pulses: Normal pulses.            Dorsalis pedis pulses are 2+ on the right side and 2+ on the left side. Posterior tibial pulses are 2+ on the right side and 2+ on the left side. Pulmonary:      Effort: Pulmonary effort is normal. No respiratory distress. Musculoskeletal:         General: Swelling, tenderness and signs of injury present. Cervical back: Normal range of motion and neck supple. Right foot: No foot drop. Left foot: No foot drop. Comments: Diffuse swelling and pain in left 1st-4th TMTJ. Pes planus presents bilaterally. Skin:     General: Skin is warm. Coloration: Skin is not cyanotic or mottled. Findings: No abscess, erythema or wound. Nails: There is no clubbing. Neurological:      General: No focal deficit present. Mental Status: He is alert and oriented to person, place, and time. Cranial Nerves: No cranial nerve deficit. Sensory: No sensory deficit. Motor: No weakness. Coordination: Coordination normal.   Psychiatric:         Mood and Affect: Mood normal.         Behavior: Behavior normal.         Thought Content:  Thought content normal.         Judgment: Judgment normal.

## 2023-09-19 NOTE — PROGRESS NOTES
PATIENT:  Keeley Corrigan  1945       ASSESSMENT:     1. Closed nondisplaced fracture of fourth metatarsal bone of left foot, initial encounter  Ambulatory Referral to [address] lower extremity wo contrast left      2. Lisfranc's sprain, left, initial encounter  Cam [facility]    CT lower extremity wo contrast left      3. Pain in left foot  Ambulatory Referral to Podiatry                PLAN:  1. Reviewed medical records. Reviewed the note from ED. Reviewed the note from orthopedics. Patient was counseled and educated on the condition and the diagnosis. 2. X-ray was personally reviewed. The radiological findings were discussed with the patient. 3. The diagnosis, treatment options and prognosis were discussed with the patient. 4. He still has significant swelling and pain around left TMTJ. Will order CT scan to rule out Lisfranc's joint injury. 5. Dispensed CAM walker. 6. Resting, elevation, icing, and compression. ACE wrap applied for compression of left foot. 7. Patient will return after the test.        Imaging: I have personally reviewed pertinent films in PACS  Labs, pathology, and Other Studies: I have personally reviewed pertinent reports. Subjective:       HPI  The patient was referred to my office for left foot injury. He tripped over a branch and his left foot twisted about 3 weeks ago. He had pain and swelling and went to ED. X- ray showed a fracture. He presents with surgical shoe today. Pain is about 6 out of 10. Still has swelling in left midfoot. No associated numbness or paresthesia. No significant weakness or dysfunction. The following portions of the patient's history were reviewed and updated as appropriate: allergies, current medications, past family history, past medical history, past social history, past surgical history and problem list.  All pertinent labs and images were reviewed.       Past Medical History  Past Medical History:   Diagnosis Date   • Hyperlipidemia    • Hypertension        Past Surgical History  History reviewed. No pertinent surgical history. Allergies:  Amoxicillin, Atorvastatin, Cerivastatin, Colchicine, Fluvastatin, Pitavastatin, Statins, and Verapamil    Medications:  Current Outpatient Medications   Medication Sig Dispense Refill   • Bioflavonoid Products (JORDI-C) TABS Take 1 tablet by mouth daily     • Coenzyme Q10 (COQ-10 PO) Take 1 tablet by mouth daily     • cyclobenzaprine (FLEXERIL) 10 mg tablet Take 1 tablet (10 mg total) by mouth 3 (three) times a day 21 tablet 0   • ezetimibe (ZETIA) 10 mg tablet Take by mouth     • Flaxseed, Linseed, (FLAXSEED OIL) 1000 MG CAPS Take 1,000 mg by mouth daily     • glucosamine-chondroitin 500-400 MG tablet Take 1 tablet by mouth daily     • Lecithin 1200 MG CAPS Take 1,200 mg by mouth daily     • losartan (COZAAR) 25 mg tablet Take 25 mg by mouth daily     • polyethylene glycol (MIRALAX) 17 g packet Take 17 g by mouth daily 14 each 0   • rosuvastatin (CRESTOR) 5 mg tablet Take by mouth     • senna-docusate sodium (SENOKOT S) 8.6-50 mg per tablet Take 1 tablet by mouth daily at bedtime 30 tablet 0   • Zinc Picolinate 25 MG TABS Take 25 mg by mouth daily     • famotidine (PEPCID) 20 mg tablet Take 1 tablet (20 mg total) by mouth 2 (two) times a day for 5 days 10 tablet 0   • ibuprofen (MOTRIN) 400 mg tablet Take 1 tablet (400 mg total) by mouth every 8 (eight) hours for 3 days 9 tablet 0     No current facility-administered medications for this visit.        Social History:  Social History     Socioeconomic History   • Marital status: /Civil Union     Spouse name: None   • Number of children: None   • Years of education: None   • Highest education level: None   Occupational History   • None   Tobacco Use   • Smoking status: Former   • Smokeless tobacco: Former   Substance and Sexual Activity   • Alcohol use: No   • Drug use: No   • Sexual activity: None   Other Topics Concern   • None   Social History Narrative   • None     Social Determinants of Health     Financial Resource Strain: Not on file   Food Insecurity: Not on file   Transportation Needs: Not on file   Physical Activity: Not on file   Stress: Not on file   Social Connections: Not on file   Intimate Partner Violence: Not on file   Housing Stability: Not on file          Review of Systems   Constitutional: Negative for chills and fever. Respiratory: Negative for cough and shortness of breath. Cardiovascular: Negative for chest pain. Gastrointestinal: Negative for nausea and vomiting. Musculoskeletal: Positive for joint swelling. Skin: Negative for rash and wound. Allergic/Immunologic: Negative for immunocompromised state. Neurological: Negative for weakness and numbness. Hematological: Negative. Psychiatric/Behavioral: Negative for behavioral problems and confusion. Objective:      /71   Pulse 58   Ht 5' 8" (1.727 m)   Wt 93.4 kg (206 lb)   BMI 31.32 kg/m²          Physical Exam  Vitals reviewed. Constitutional:       General: He is not in acute distress. Appearance: He is not toxic-appearing or diaphoretic. HENT:      Head: Normocephalic and atraumatic. Eyes:      Extraocular Movements: Extraocular movements intact. Cardiovascular:      Rate and Rhythm: Normal rate and regular rhythm. Pulses: Normal pulses. Dorsalis pedis pulses are 2+ on the right side and 2+ on the left side. Posterior tibial pulses are 2+ on the right side and 2+ on the left side. Pulmonary:      Effort: Pulmonary effort is normal. No respiratory distress. Musculoskeletal:         General: Swelling, tenderness and signs of injury present. Cervical back: Normal range of motion and neck supple. Right foot: No foot drop. Left foot: No foot drop. Comments: Diffuse swelling and pain in left 1st-4th TMTJ. Pes planus presents bilaterally.      Skin: General: Skin is warm. Coloration: Skin is not cyanotic or mottled. Findings: No abscess, erythema or wound. Nails: There is no clubbing. Neurological:      General: No focal deficit present. Mental Status: He is alert and oriented to person, place, and time. Cranial Nerves: No cranial nerve deficit. Sensory: No sensory deficit. Motor: No weakness. Coordination: Coordination normal.   Psychiatric:         Mood and Affect: Mood normal.         Behavior: Behavior normal.         Thought Content:  Thought content normal.         Judgment: Judgment normal.

## 2023-09-25 ENCOUNTER — HOSPITAL ENCOUNTER (OUTPATIENT)
Dept: RADIOLOGY | Facility: MEDICAL CENTER | Age: 78
Discharge: HOME | End: 2023-09-25
Payer: COMMERCIAL

## 2023-09-25 DIAGNOSIS — S93.622A LISFRANC'S SPRAIN, LEFT, INITIAL ENCOUNTER: ICD-10-CM

## 2023-09-25 DIAGNOSIS — S92.345A CLOSED NONDISPLACED FRACTURE OF FOURTH METATARSAL BONE OF LEFT FOOT, INITIAL ENCOUNTER: ICD-10-CM

## 2023-09-25 PROCEDURE — 73700 CT LOWER EXTREMITY W/O DYE: CPT

## 2023-09-25 PROCEDURE — G1004 CDSM NDSC: HCPCS

## 2023-10-03 ENCOUNTER — OFFICE VISIT (OUTPATIENT)
Dept: PODIATRY | Facility: CLINIC | Age: 78
End: 2023-10-03
Payer: COMMERCIAL

## 2023-10-03 VITALS
WEIGHT: 206 LBS | BODY MASS INDEX: 31.22 KG/M2 | DIASTOLIC BLOOD PRESSURE: 93 MMHG | SYSTOLIC BLOOD PRESSURE: 137 MMHG | HEART RATE: 72 BPM | HEIGHT: 68 IN

## 2023-10-03 DIAGNOSIS — S92.345A CLOSED NONDISPLACED FRACTURE OF FOURTH METATARSAL BONE OF LEFT FOOT, INITIAL ENCOUNTER: Primary | ICD-10-CM

## 2023-10-03 DIAGNOSIS — S93.622A LISFRANC'S SPRAIN, LEFT, INITIAL ENCOUNTER: ICD-10-CM

## 2023-10-03 PROCEDURE — 99214 OFFICE O/P EST MOD 30 MIN: CPT | Performed by: PODIATRIST

## 2023-10-03 NOTE — PROGRESS NOTES
PATIENT:  Otilia Carrillo  1945       ASSESSMENT:     1. Closed nondisplaced fracture of fourth metatarsal bone of left foot, initial encounter        2. Lisfranc's sprain, left, initial encounter                PLAN:  1. Reviewed medical records. Patient was counseled and educated on the condition and the diagnosis. 2. CT scan was personally reviewed. The radiological findings were discussed with the patient. 3. The diagnosis, treatment options and prognosis were discussed with the patient. 4. Discussed options including surgical vs non-surgical treatment. Discussed advantage and disadvantage of both options. The optimal option would be ORIF of left foot Lisfranc's injury. Explained surgical details and post-op course. 4. Pt to discuss with his family about his option because his wife is sick and he gets no help. 5. Recommended NWB with CAM walker. Instructed purchase information for new boot. Pt to bring his old boot. 6. Resting, elevation, icing, and compression. Imaging: I have personally reviewed pertinent films in PACS  Labs, pathology, and Other Studies: I have personally reviewed pertinent reports. I have spent a total time of 30 minutes on 10/03/23 in caring for this patient including Diagnostic results, Prognosis, Risks and benefits of tx options, Importance of tx compliance, Risk factor reductions, Impressions, Counseling / Coordination of care and Reviewing / ordering tests, medicine, procedures  . Subjective:     HPI  The patient presents for follow-up on left foot injury. Pain is still about 5 out of 10. Swelling / bump still presents. He presents with surgical shoe today. The boot was heavy and falling apart. He had to be on his feet quite a bit because he had to take care of his wife who has Parkinson's disease.     The following portions of the patient's history were reviewed and updated as appropriate: allergies, current medications, past family history, past medical history, past social history, past surgical history and problem list.  All pertinent labs and images were reviewed. Past Medical History  Past Medical History:   Diagnosis Date   • Hyperlipidemia    • Hypertension        Past Surgical History  History reviewed. No pertinent surgical history. Allergies:  Amoxicillin, Atorvastatin, Cerivastatin, Colchicine, Fluvastatin, Pitavastatin, Statins, and Verapamil    Medications:  Current Outpatient Medications   Medication Sig Dispense Refill   • Bioflavonoid Products (JORDI-C) TABS Take 1 tablet by mouth daily     • Coenzyme Q10 (COQ-10 PO) Take 1 tablet by mouth daily     • cyclobenzaprine (FLEXERIL) 10 mg tablet Take 1 tablet (10 mg total) by mouth 3 (three) times a day 21 tablet 0   • ezetimibe (ZETIA) 10 mg tablet Take by mouth     • Flaxseed, Linseed, (FLAXSEED OIL) 1000 MG CAPS Take 1,000 mg by mouth daily     • glucosamine-chondroitin 500-400 MG tablet Take 1 tablet by mouth daily     • Lecithin 1200 MG CAPS Take 1,200 mg by mouth daily     • losartan (COZAAR) 25 mg tablet Take 25 mg by mouth daily     • polyethylene glycol (MIRALAX) 17 g packet Take 17 g by mouth daily 14 each 0   • rosuvastatin (CRESTOR) 5 mg tablet Take by mouth     • senna-docusate sodium (SENOKOT S) 8.6-50 mg per tablet Take 1 tablet by mouth daily at bedtime 30 tablet 0   • Zinc Picolinate 25 MG TABS Take 25 mg by mouth daily     • famotidine (PEPCID) 20 mg tablet Take 1 tablet (20 mg total) by mouth 2 (two) times a day for 5 days 10 tablet 0   • ibuprofen (MOTRIN) 400 mg tablet Take 1 tablet (400 mg total) by mouth every 8 (eight) hours for 3 days 9 tablet 0     No current facility-administered medications for this visit.        Social History:  Social History     Socioeconomic History   • Marital status: /Civil Union     Spouse name: None   • Number of children: None   • Years of education: None   • Highest education level: None   Occupational History   • None   Tobacco Use   • Smoking status: Former   • Smokeless tobacco: Former   Substance and Sexual Activity   • Alcohol use: No   • Drug use: No   • Sexual activity: None   Other Topics Concern   • None   Social History Narrative   • None     Social Determinants of Health     Financial Resource Strain: Not on file   Food Insecurity: Not on file   Transportation Needs: Not on file   Physical Activity: Not on file   Stress: Not on file   Social Connections: Not on file   Intimate Partner Violence: Not on file   Housing Stability: Not on file          Review of Systems   Constitutional: Negative for chills and fever. Respiratory: Negative for cough and shortness of breath. Cardiovascular: Negative for chest pain. Gastrointestinal: Negative for nausea and vomiting. Musculoskeletal: Positive for joint swelling. Skin: Negative for rash and wound. Allergic/Immunologic: Negative for immunocompromised state. Neurological: Negative for weakness and numbness. Hematological: Negative. Psychiatric/Behavioral: Negative for behavioral problems and confusion. Objective:      /93   Pulse 72   Ht 5' 8" (1.727 m)   Wt 93.4 kg (206 lb)   BMI 31.32 kg/m²          Physical Exam  Vitals reviewed. Constitutional:       General: He is not in acute distress. Appearance: He is not toxic-appearing or diaphoretic. Cardiovascular:      Rate and Rhythm: Normal rate and regular rhythm. Pulses: Normal pulses. Dorsalis pedis pulses are 2+ on the right side and 2+ on the left side. Posterior tibial pulses are 2+ on the right side and 2+ on the left side. Pulmonary:      Effort: Pulmonary effort is normal. No respiratory distress. Musculoskeletal:         General: Swelling, tenderness and signs of injury present. Right foot: No foot drop. Left foot: No foot drop. Comments: Diffuse swelling and pain in left 1st-4th TMTJ. Pes planus presents bilaterally. Skin:     General: Skin is warm. Coloration: Skin is not cyanotic or mottled. Findings: No abscess, erythema or wound. Nails: There is no clubbing. Neurological:      General: No focal deficit present. Mental Status: He is alert and oriented to person, place, and time. Cranial Nerves: No cranial nerve deficit. Sensory: No sensory deficit. Motor: No weakness. Coordination: Coordination normal.   Psychiatric:         Mood and Affect: Mood normal.         Behavior: Behavior normal.         Thought Content:  Thought content normal.         Judgment: Judgment normal.

## 2023-10-05 ENCOUNTER — TELEPHONE (OUTPATIENT)
Age: 78
End: 2023-10-05

## 2023-10-05 NOTE — TELEPHONE ENCOUNTER
Caller: Alla Mccann    Doctor/Office: Dr. Catie Quinn    #: [phone]    Escalation: Care/PT said office will give him a new boot/old one is faulty? I see Dr Rebel Cruz instructed him on purchasing a new boot/in OV notes-is he to purchase or are we to dispense? Also says to bring old boot into office? Pt said Dr Rebel Cruz said we can give him one at office. Please call pt back/advise.  Thanks

## 2023-10-06 ENCOUNTER — TELEPHONE (OUTPATIENT)
Age: 78
End: 2023-10-06

## 2023-10-06 NOTE — TELEPHONE ENCOUNTER
Caller: Self    Doctor: Dr Osiris Hebert    Reason for call: Patient returning call, transferred to POD    Call back#: [phone]

## 2023-10-06 NOTE — TELEPHONE ENCOUNTER
Caller: Ana Escalante    Doctor/Office: Dr. Indiana Bellamy    #: [phone]    Escalation: Care Patients shoe size is 9.5, the current boot he has is called air select short large. Patient would like a return call today to discuss when he can  the new boot. Patient stated to leave a detailed message is if is not available when he is called back.  Thank you

## 2023-10-17 ENCOUNTER — OFFICE VISIT (OUTPATIENT)
Dept: PODIATRY | Facility: CLINIC | Age: 78
End: 2023-10-17
Payer: COMMERCIAL

## 2023-10-17 VITALS
DIASTOLIC BLOOD PRESSURE: 70 MMHG | HEART RATE: 64 BPM | RESPIRATION RATE: 18 BRPM | BODY MASS INDEX: 31.32 KG/M2 | SYSTOLIC BLOOD PRESSURE: 138 MMHG | HEIGHT: 68 IN

## 2023-10-17 DIAGNOSIS — S93.622A LISFRANC'S SPRAIN, LEFT, INITIAL ENCOUNTER: Primary | ICD-10-CM

## 2023-10-17 DIAGNOSIS — S92.345A CLOSED NONDISPLACED FRACTURE OF FOURTH METATARSAL BONE OF LEFT FOOT, INITIAL ENCOUNTER: ICD-10-CM

## 2023-10-17 PROCEDURE — 99213 OFFICE O/P EST LOW 20 MIN: CPT | Performed by: PODIATRIST

## 2023-10-17 NOTE — PROGRESS NOTES
PATIENT:  Otilia Carrillo  1945       ASSESSMENT:     1. Lisfranc's sprain, left, initial encounter        2. Closed nondisplaced fracture of fourth metatarsal bone of left foot, initial encounter                PLAN:  1. Reviewed medical records. Patient was counseled and educated on the condition and the diagnosis. 2. CT scan was personally reviewed. The radiological findings were discussed with the patient and his daughter. 3. The diagnosis, treatment options and prognosis were discussed with the patient. 4. Discussed options including surgical vs non-surgical treatment. Discussed advantage and disadvantage of both options. The optimal option would be ORIF of left foot Lisfranc's injury. Explained surgical details and post-op course. 4. Pt and his daughter wished to proceed with surgical treatment. 5. Explained surgical details and post-op course. Discussed all risks and complications related to the patient's condition and surgery. The benefits of surgery were also discussed. The patient understood that the surgery would not guarantee desirable outcome. All questions and concerns were addressed and the consent was signed. 6. His daughter will call the office in the next few days to co-ordinate scheduling after taking care of post-op facility for him and his wife. Imaging: I have personally reviewed pertinent films in PACS  Labs, pathology, and Other Studies: I have personally reviewed pertinent reports. Subjective:     HPI  The patient presents for left foot injury. His daughter presents today to discuss his options including surgical treatment and post-op care. Pain is still about 4-5 out of 10. Swelling / bump still presents. He presents with CAM walker. Not compliant about NWB. No new complaint.       The following portions of the patient's history were reviewed and updated as appropriate: allergies, current medications, past family history, past medical history, past social history, past surgical history and problem list.  All pertinent labs and images were reviewed. Past Medical History  Past Medical History:   Diagnosis Date    Hyperlipidemia     Hypertension        Past Surgical History  No past surgical history on file. Allergies:  Amoxicillin, Atorvastatin, Cerivastatin, Colchicine, Fluvastatin, Pitavastatin, Statins, and Verapamil    Medications:  Current Outpatient Medications   Medication Sig Dispense Refill    Bioflavonoid Products (JORDI-C) TABS Take 1 tablet by mouth daily      Coenzyme Q10 (COQ-10 PO) Take 1 tablet by mouth daily      cyclobenzaprine (FLEXERIL) 10 mg tablet Take 1 tablet (10 mg total) by mouth 3 (three) times a day 21 tablet 0    ezetimibe (ZETIA) 10 mg tablet Take by mouth      famotidine (PEPCID) 20 mg tablet Take 1 tablet (20 mg total) by mouth 2 (two) times a day for 5 days 10 tablet 0    Flaxseed, Linseed, (FLAXSEED OIL) 1000 MG CAPS Take 1,000 mg by mouth daily      glucosamine-chondroitin 500-400 MG tablet Take 1 tablet by mouth daily      ibuprofen (MOTRIN) 400 mg tablet Take 1 tablet (400 mg total) by mouth every 8 (eight) hours for 3 days 9 tablet 0    Lecithin 1200 MG CAPS Take 1,200 mg by mouth daily      losartan (COZAAR) 25 mg tablet Take 25 mg by mouth daily      polyethylene glycol (MIRALAX) 17 g packet Take 17 g by mouth daily 14 each 0    rosuvastatin (CRESTOR) 5 mg tablet Take by mouth      senna-docusate sodium (SENOKOT S) 8.6-50 mg per tablet Take 1 tablet by mouth daily at bedtime 30 tablet 0    Zinc Picolinate 25 MG TABS Take 25 mg by mouth daily       No current facility-administered medications for this visit.        Social History:  Social History     Socioeconomic History    Marital status: /Civil Union     Spouse name: None    Number of children: None    Years of education: None    Highest education level: None   Occupational History    None   Tobacco Use    Smoking status: Former Smokeless tobacco: Former   Substance and Sexual Activity    Alcohol use: No    Drug use: No    Sexual activity: None   Other Topics Concern    None   Social History Narrative    None     Social Determinants of Health     Financial Resource Strain: Not on file   Food Insecurity: Not on file   Transportation Needs: Not on file   Physical Activity: Not on file   Stress: Not on file   Social Connections: Not on file   Intimate Partner Violence: Not on file   Housing Stability: Not on file          Review of Systems   Constitutional:  Negative for chills and fever. Respiratory:  Negative for cough and shortness of breath. Cardiovascular:  Negative for chest pain. Gastrointestinal:  Negative for nausea and vomiting. Musculoskeletal:  Positive for joint swelling. Skin:  Negative for rash and wound. Allergic/Immunologic: Negative for immunocompromised state. Neurological:  Negative for weakness and numbness. Hematological: Negative. Psychiatric/Behavioral:  Negative for behavioral problems and confusion. Objective:      /70   Pulse 64   Resp 18   Ht 5' 8" (1.727 m)   BMI 31.32 kg/m²          Physical Exam  Vitals reviewed. Constitutional:       General: He is not in acute distress. Appearance: He is not toxic-appearing or diaphoretic. Cardiovascular:      Rate and Rhythm: Normal rate and regular rhythm. Pulses: Normal pulses. Dorsalis pedis pulses are 2+ on the right side and 2+ on the left side. Posterior tibial pulses are 2+ on the right side and 2+ on the left side. Pulmonary:      Effort: Pulmonary effort is normal. No respiratory distress. Musculoskeletal:         General: Swelling, tenderness and signs of injury present. Right foot: No foot drop. Left foot: No foot drop. Comments: Diffuse swelling and pain in left 1st-4th TMTJ. Pes planus presents bilaterally. Skin:     General: Skin is warm.       Coloration: Skin is not cyanotic or mottled. Findings: No abscess, erythema or wound. Nails: There is no clubbing. Neurological:      General: No focal deficit present. Mental Status: He is alert and oriented to person, place, and time. Cranial Nerves: No cranial nerve deficit. Sensory: No sensory deficit. Motor: No weakness. Coordination: Coordination normal.   Psychiatric:         Mood and Affect: Mood normal.         Behavior: Behavior normal.         Thought Content:  Thought content normal.         Judgment: Judgment normal.

## 2023-11-07 ENCOUNTER — APPOINTMENT (OUTPATIENT)
Dept: LAB | Age: 78
End: 2023-11-07
Payer: COMMERCIAL

## 2023-11-07 ENCOUNTER — PREP FOR PROCEDURE (OUTPATIENT)
Dept: OBGYN CLINIC | Facility: CLINIC | Age: 78
End: 2023-11-07

## 2023-11-07 ENCOUNTER — LAB (OUTPATIENT)
Dept: LAB | Age: 78
End: 2023-11-07
Payer: COMMERCIAL

## 2023-11-07 DIAGNOSIS — Z01.818 PRE-OP EVALUATION: ICD-10-CM

## 2023-11-07 DIAGNOSIS — Z01.818 PRE-OP EVALUATION: Primary | ICD-10-CM

## 2023-11-07 DIAGNOSIS — S93.325A CLOSED DISLOCATION OF TARSOMETATARSAL JOINT OF LEFT FOOT, INITIAL ENCOUNTER: Primary | ICD-10-CM

## 2023-11-07 LAB
ANION GAP SERPL CALCULATED.3IONS-SCNC: 9 MMOL/L
ATRIAL RATE: 62 BPM
BASOPHILS # BLD AUTO: 0.05 THOUSANDS/ÂΜL (ref 0–0.1)
BASOPHILS NFR BLD AUTO: 1 % (ref 0–1)
BUN SERPL-MCNC: 17 MG/DL (ref 5–25)
CALCIUM SERPL-MCNC: 9.5 MG/DL (ref 8.4–10.2)
CHLORIDE SERPL-SCNC: 104 MMOL/L (ref 96–108)
CO2 SERPL-SCNC: 29 MMOL/L (ref 21–32)
CREAT SERPL-MCNC: 0.91 MG/DL (ref 0.6–1.3)
EOSINOPHIL # BLD AUTO: 0 THOUSAND/ÂΜL (ref 0–0.61)
EOSINOPHIL NFR BLD AUTO: 0 % (ref 0–6)
ERYTHROCYTE [DISTWIDTH] IN BLOOD BY AUTOMATED COUNT: 12.8 % (ref 11.6–15.1)
GFR SERPL CREATININE-BSD FRML MDRD: 81 ML/MIN/1.73SQ M
GLUCOSE SERPL-MCNC: 117 MG/DL (ref 65–140)
HCT VFR BLD AUTO: 43.7 % (ref 36.5–49.3)
HGB BLD-MCNC: 14.5 G/DL (ref 12–17)
IMM GRANULOCYTES # BLD AUTO: 0.02 THOUSAND/UL (ref 0–0.2)
IMM GRANULOCYTES NFR BLD AUTO: 0 % (ref 0–2)
LYMPHOCYTES # BLD AUTO: 2.22 THOUSANDS/ÂΜL (ref 0.6–4.47)
LYMPHOCYTES NFR BLD AUTO: 30 % (ref 14–44)
MCH RBC QN AUTO: 32.6 PG (ref 26.8–34.3)
MCHC RBC AUTO-ENTMCNC: 33.2 G/DL (ref 31.4–37.4)
MCV RBC AUTO: 98 FL (ref 82–98)
MONOCYTES # BLD AUTO: 0.72 THOUSAND/ÂΜL (ref 0.17–1.22)
MONOCYTES NFR BLD AUTO: 10 % (ref 4–12)
NEUTROPHILS # BLD AUTO: 4.47 THOUSANDS/ÂΜL (ref 1.85–7.62)
NEUTS SEG NFR BLD AUTO: 59 % (ref 43–75)
NRBC BLD AUTO-RTO: 0 /100 WBCS
PLATELET # BLD AUTO: 224 THOUSANDS/UL (ref 149–390)
PMV BLD AUTO: 10.8 FL (ref 8.9–12.7)
POTASSIUM SERPL-SCNC: 4 MMOL/L (ref 3.5–5.3)
PR INTERVAL: 186 MS
QRS AXIS: -60 DEGREES
QRSD INTERVAL: 126 MS
QT INTERVAL: 404 MS
QTC INTERVAL: 410 MS
RBC # BLD AUTO: 4.45 MILLION/UL (ref 3.88–5.62)
SODIUM SERPL-SCNC: 142 MMOL/L (ref 135–147)
T WAVE AXIS: 33 DEGREES
VENTRICULAR RATE: 62 BPM
WBC # BLD AUTO: 7.48 THOUSAND/UL (ref 4.31–10.16)

## 2023-11-07 PROCEDURE — 93010 ELECTROCARDIOGRAM REPORT: CPT | Performed by: INTERNAL MEDICINE

## 2023-11-07 PROCEDURE — 36415 COLL VENOUS BLD VENIPUNCTURE: CPT

## 2023-11-07 PROCEDURE — 85025 COMPLETE CBC W/AUTO DIFF WBC: CPT

## 2023-11-07 PROCEDURE — 80048 BASIC METABOLIC PNL TOTAL CA: CPT

## 2023-11-07 PROCEDURE — 93005 ELECTROCARDIOGRAM TRACING: CPT

## 2023-11-07 RX ORDER — CHLORHEXIDINE GLUCONATE ORAL RINSE 1.2 MG/ML
15 SOLUTION DENTAL ONCE
Status: CANCELLED | OUTPATIENT
Start: 2023-11-17

## 2023-11-07 RX ORDER — CLINDAMYCIN PHOSPHATE 900 MG/50ML
900 INJECTION INTRAVENOUS ONCE
Status: CANCELLED | OUTPATIENT
Start: 2023-11-17

## 2023-11-07 RX ORDER — CHLORHEXIDINE GLUCONATE 4 G/100ML
SOLUTION TOPICAL DAILY PRN
Status: CANCELLED | OUTPATIENT
Start: 2023-11-17

## 2023-11-08 ENCOUNTER — TELEPHONE (OUTPATIENT)
Age: 78
End: 2023-11-08

## 2023-11-08 NOTE — TELEPHONE ENCOUNTER
Spoke with  at [facility] internal med. Told them that no [facility] form is needed. The PCP can just write visit note and we will be able to see it in EPIC.  No further qeuestions

## 2023-11-08 NOTE — TELEPHONE ENCOUNTER
Caller: [facility]    Doctor/Office: Dr. Cassy Ozuna    #: [phone]    Escalation: Surgery Calling on behalf of Mitra Willard. Patient is current at [facility] for pre op clearance but they have no paperwork for this or any surgery details. Please return call ASAP.  Thank you

## 2023-11-09 ENCOUNTER — TELEPHONE (OUTPATIENT)
Age: 78
End: 2023-11-09

## 2023-11-09 NOTE — TELEPHONE ENCOUNTER
Caller: Doreen Murray    Doctor: Dr. Daniel Harmon    Reason for call: said he rec'd a call about a form we needed? I read the notes and it seems all is in order for SX 11/17, but he would like a call back to confirm all is good with SX and can proceed as planned. If he does not answer please leave a message.  Thanks     Call back#: [phone]

## 2023-11-13 ENCOUNTER — TELEPHONE (OUTPATIENT)
Age: 78
End: 2023-11-13

## 2023-11-13 ENCOUNTER — TELEPHONE (OUTPATIENT)
Dept: OBGYN CLINIC | Facility: CLINIC | Age: 78
End: 2023-11-13

## 2023-11-13 ENCOUNTER — TELEPHONE (OUTPATIENT)
Dept: PODIATRY | Facility: CLINIC | Age: 78
End: 2023-11-13

## 2023-11-13 NOTE — TELEPHONE ENCOUNTER
Left detailed message for [address] PAT dept asking for them to call patient back to go over PAT questions. Provided my direct ph [phone] if they needed to speak with me.

## 2023-11-13 NOTE — TELEPHONE ENCOUNTER
Caller: [facility], Samra Vazquez.     Doctor: Frank Castano DPM    Reason for call: Mr. Joann Quijano is temporarily staying at [facility]. He is scheduled for surgery on 11/17/23. [facility] will need any instructions prior to surgery along with any medication holds. Call back#: [phone] - David Sharp. This is also the number the hospital will need to call the day before with the time.

## 2023-11-13 NOTE — TELEPHONE ENCOUNTER
Spoke with patient. He stated that last week, on Friday, he received a call from [facility]. The caller said that the phone call would take about 15 minutes. Patient was with his wife at the assisted living facility at the time and was unable to talk. The caller never called back. When I spoke with patient, I explained that it was probably PAT dept calling to review some questions prior to surgery. I told patient that I will send a message to the PAT dept to have them call patient back. Patient understood.

## 2023-11-14 RX ORDER — GABAPENTIN 100 MG/1
300 CAPSULE ORAL 2 TIMES DAILY
COMMUNITY

## 2023-11-14 RX ORDER — FOLIC ACID 1 MG/1
TABLET ORAL DAILY
COMMUNITY

## 2023-11-14 NOTE — PRE-PROCEDURE INSTRUCTIONS
Pre-Surgery Instructions:   Medication Instructions    ezetimibe (ZETIA) 10 mg tablet Take day of surgery. folic acid (FOLVITE) 1 mg tablet Stop taking 3 days prior to surgery. gabapentin (NEURONTIN) 100 mg capsule Take day of surgery. losartan (COZAAR) 25 mg tablet Hold day of surgery. rosuvastatin (CRESTOR) 5 mg tablet Take night before surgery    Zinc Picolinate 25 MG TABS Stop taking 3 days prior to surgery. Instructions provided to [facility] 3 days prior to surgery. Medication instructions for day surgery reviewed. Please use only a sip of water to take your instructed medications. Avoid all over the counter vitamins, supplements and NSAIDS for one week prior to surgery per anesthesia guidelines. Tylenol is ok to take as needed. You will receive a call one business day prior to surgery with an arrival time and hospital directions. If your surgery is scheduled on a Monday, the hospital will be calling you on the Friday prior to your surgery. If you have not heard from anyone by 8pm, please call the hospital supervisor through the hospital  at [phone]. David Pace [phone]). Do not eat or drink anything after midnight the night before your surgery, including candy, mints, lifesavers, or chewing gum. Do not drink alcohol 24hrs before your surgery. Try not to smoke at least 24hrs before your surgery. Follow the pre surgery showering instructions as listed in the [facility] Surgical Experience Booklet” or otherwise provided by your surgeon's office. Do not use a blade to shave the surgical area 1 week before surgery. It is okay to use a clean electric clippers up to 24 hours before surgery. Do not apply any lotions, creams, including makeup, cologne, deodorant, or perfumes after showering on the day of your surgery. Do not use dry shampoo, hair spray, hair gel, or any type of hair products. No contact lenses, eye make-up, or artificial eyelashes.  Remove nail polish, including gel polish, and any artificial, gel, or acrylic nails if possible. Remove all jewelry including rings and body piercing jewelry. Wear causal clothing that is easy to take on and off. Consider your type of surgery. Keep any valuables, jewelry, piercings at home. Please bring any specially ordered equipment (sling, braces) if indicated. Arrange for a responsible person to drive you to and from the hospital on the day of your surgery. Visitor Guidelines discussed. Call the surgeon's office with any new illnesses, exposures, or additional questions prior to surgery. Please reference your [facility] Surgical Experience Booklet” for additional information to prepare for your upcoming surgery.

## 2023-11-14 NOTE — TELEPHONE ENCOUNTER
Called  [facility] dept, spoke with Kris. Explained situation about patient temporarily staying at [facility]. Kris said that she will reach out to Asst Олег. Admin, at [facility] to discuss and get necessary paperwork.   Case documented to reflect change in location/ph#

## 2023-11-16 ENCOUNTER — ANESTHESIA EVENT (OUTPATIENT)
Dept: PERIOP | Facility: HOSPITAL | Age: 78
End: 2023-11-16
Payer: COMMERCIAL

## 2023-11-17 ENCOUNTER — HOSPITAL ENCOUNTER (OUTPATIENT)
Facility: HOSPITAL | Age: 78
Setting detail: OUTPATIENT SURGERY
Discharge: HOME | End: 2023-11-17
Attending: PODIATRIST | Admitting: PODIATRIST
Payer: COMMERCIAL

## 2023-11-17 ENCOUNTER — APPOINTMENT (OUTPATIENT)
Dept: RADIOLOGY | Facility: HOSPITAL | Age: 78
End: 2023-11-17
Payer: COMMERCIAL

## 2023-11-17 ENCOUNTER — ANESTHESIA (OUTPATIENT)
Dept: PERIOP | Facility: HOSPITAL | Age: 78
End: 2023-11-17
Payer: COMMERCIAL

## 2023-11-17 VITALS
DIASTOLIC BLOOD PRESSURE: 64 MMHG | TEMPERATURE: 97.4 F | HEART RATE: 56 BPM | OXYGEN SATURATION: 95 % | HEIGHT: 68 IN | BODY MASS INDEX: 31.22 KG/M2 | SYSTOLIC BLOOD PRESSURE: 124 MMHG | WEIGHT: 206 LBS | RESPIRATION RATE: 18 BRPM

## 2023-11-17 DIAGNOSIS — Z98.890 POST-OPERATIVE STATE: Primary | ICD-10-CM

## 2023-11-17 PROCEDURE — 73620 X-RAY EXAM OF FOOT: CPT

## 2023-11-17 PROCEDURE — C1713 ANCHOR/SCREW BN/BN,TIS/BN: HCPCS | Performed by: PODIATRIST

## 2023-11-17 PROCEDURE — 73630 X-RAY EXAM OF FOOT: CPT

## 2023-11-17 PROCEDURE — 28615 REPAIR FOOT DISLOCATION: CPT | Performed by: PODIATRIST

## 2023-11-17 PROCEDURE — C9290 INJ, BUPIVACAINE LIPOSOME: HCPCS | Performed by: PODIATRIST

## 2023-11-17 PROCEDURE — 99024 POSTOP FOLLOW-UP VISIT: CPT | Performed by: PODIATRIST

## 2023-11-17 DEVICE — SCREW
Type: IMPLANTABLE DEVICE | Site: FOOT | Status: FUNCTIONAL
Brand: CLAW CHARLOTTE

## 2023-11-17 DEVICE — LISFRANC SCREW
Type: IMPLANTABLE DEVICE | Site: FOOT | Status: FUNCTIONAL
Brand: CHARLOTTE

## 2023-11-17 DEVICE — IMPLANTABLE DEVICE
Type: IMPLANTABLE DEVICE | Site: FOOT | Status: FUNCTIONAL
Brand: CHARLOTTE

## 2023-11-17 DEVICE — IMPLANTABLE DEVICE
Type: IMPLANTABLE DEVICE | Site: FOOT | Status: FUNCTIONAL
Brand: CLAW CHARLOTTE

## 2023-11-17 DEVICE — MTP HEX SCREW
Type: IMPLANTABLE DEVICE | Site: FOOT | Status: FUNCTIONAL
Brand: CHARLOTTE

## 2023-11-17 RX ORDER — OXYCODONE HYDROCHLORIDE AND ACETAMINOPHEN 5; 325 MG/1; MG/1
1 TABLET ORAL EVERY 4 HOURS PRN
Qty: 30 TABLET | Refills: 0 | Status: SHIPPED | OUTPATIENT
Start: 2023-11-17 | End: 2023-11-27

## 2023-11-17 RX ORDER — SODIUM CHLORIDE, SODIUM LACTATE, POTASSIUM CHLORIDE, CALCIUM CHLORIDE 600; 310; 30; 20 MG/100ML; MG/100ML; MG/100ML; MG/100ML
75 INJECTION, SOLUTION INTRAVENOUS CONTINUOUS
Status: DISCONTINUED | OUTPATIENT
Start: 2023-11-17 | End: 2023-11-17 | Stop reason: HOSPADM

## 2023-11-17 RX ORDER — ASPIRIN 325 MG
325 TABLET ORAL DAILY
Qty: 30 TABLET | Refills: 0 | Status: SHIPPED | OUTPATIENT
Start: 2023-11-17

## 2023-11-17 RX ORDER — FENTANYL CITRATE 50 UG/ML
INJECTION, SOLUTION INTRAMUSCULAR; INTRAVENOUS AS NEEDED
Status: DISCONTINUED | OUTPATIENT
Start: 2023-11-17 | End: 2023-11-17

## 2023-11-17 RX ORDER — DOXYCYCLINE 100 MG/1
100 CAPSULE ORAL 2 TIMES DAILY
Qty: 14 CAPSULE | Refills: 0 | Status: SHIPPED | OUTPATIENT
Start: 2023-11-17 | End: 2023-11-24

## 2023-11-17 RX ORDER — CHLORHEXIDINE GLUCONATE ORAL RINSE 1.2 MG/ML
15 SOLUTION DENTAL ONCE
Status: COMPLETED | OUTPATIENT
Start: 2023-11-17 | End: 2023-11-17

## 2023-11-17 RX ORDER — ACETAMINOPHEN 325 MG/1
650 TABLET ORAL EVERY 4 HOURS PRN
Status: DISCONTINUED | OUTPATIENT
Start: 2023-11-17 | End: 2023-11-17 | Stop reason: HOSPADM

## 2023-11-17 RX ORDER — CHLORHEXIDINE GLUCONATE 4 G/100ML
SOLUTION TOPICAL DAILY PRN
Status: DISCONTINUED | OUTPATIENT
Start: 2023-11-17 | End: 2023-11-17 | Stop reason: HOSPADM

## 2023-11-17 RX ORDER — ONDANSETRON 2 MG/ML
4 INJECTION INTRAMUSCULAR; INTRAVENOUS ONCE AS NEEDED
Status: DISCONTINUED | OUTPATIENT
Start: 2023-11-17 | End: 2023-11-17 | Stop reason: HOSPADM

## 2023-11-17 RX ORDER — PROPOFOL 10 MG/ML
INJECTION, EMULSION INTRAVENOUS AS NEEDED
Status: DISCONTINUED | OUTPATIENT
Start: 2023-11-17 | End: 2023-11-17

## 2023-11-17 RX ORDER — MAGNESIUM HYDROXIDE 1200 MG/15ML
LIQUID ORAL AS NEEDED
Status: DISCONTINUED | OUTPATIENT
Start: 2023-11-17 | End: 2023-11-17 | Stop reason: HOSPADM

## 2023-11-17 RX ORDER — CLINDAMYCIN PHOSPHATE 900 MG/50ML
900 INJECTION INTRAVENOUS ONCE
Status: COMPLETED | OUTPATIENT
Start: 2023-11-17 | End: 2023-11-17

## 2023-11-17 RX ORDER — ONDANSETRON 2 MG/ML
INJECTION INTRAMUSCULAR; INTRAVENOUS AS NEEDED
Status: DISCONTINUED | OUTPATIENT
Start: 2023-11-17 | End: 2023-11-17

## 2023-11-17 RX ORDER — FENTANYL CITRATE/PF 50 MCG/ML
25 SYRINGE (ML) INJECTION
Status: DISCONTINUED | OUTPATIENT
Start: 2023-11-17 | End: 2023-11-17 | Stop reason: HOSPADM

## 2023-11-17 RX ORDER — GLYCOPYRROLATE 0.2 MG/ML
INJECTION INTRAMUSCULAR; INTRAVENOUS AS NEEDED
Status: DISCONTINUED | OUTPATIENT
Start: 2023-11-17 | End: 2023-11-17

## 2023-11-17 RX ORDER — LIDOCAINE HYDROCHLORIDE 10 MG/ML
INJECTION, SOLUTION EPIDURAL; INFILTRATION; INTRACAUDAL; PERINEURAL AS NEEDED
Status: DISCONTINUED | OUTPATIENT
Start: 2023-11-17 | End: 2023-11-17

## 2023-11-17 RX ORDER — OXYCODONE HYDROCHLORIDE AND ACETAMINOPHEN 5; 325 MG/1; MG/1
1 TABLET ORAL EVERY 4 HOURS PRN
Status: DISCONTINUED | OUTPATIENT
Start: 2023-11-17 | End: 2023-11-17 | Stop reason: HOSPADM

## 2023-11-17 RX ADMIN — FENTANYL CITRATE 50 MCG: 50 INJECTION, SOLUTION INTRAMUSCULAR; INTRAVENOUS at 11:14

## 2023-11-17 RX ADMIN — CHLORHEXIDINE GLUCONATE 0.12% ORAL RINSE 15 ML: 1.2 LIQUID ORAL at 09:52

## 2023-11-17 RX ADMIN — CLINDAMYCIN PHOSPHATE 900 MG: 900 INJECTION, SOLUTION INTRAVENOUS at 11:10

## 2023-11-17 RX ADMIN — LIDOCAINE HYDROCHLORIDE 50 MG: 10 INJECTION, SOLUTION EPIDURAL; INFILTRATION; INTRACAUDAL; PERINEURAL at 11:15

## 2023-11-17 RX ADMIN — GLYCOPYRROLATE 0.2 MG: 0.2 INJECTION, SOLUTION INTRAMUSCULAR; INTRAVENOUS at 11:21

## 2023-11-17 RX ADMIN — FENTANYL CITRATE 50 MCG: 50 INJECTION, SOLUTION INTRAMUSCULAR; INTRAVENOUS at 12:27

## 2023-11-17 RX ADMIN — ONDANSETRON 4 MG: 2 INJECTION INTRAMUSCULAR; INTRAVENOUS at 12:44

## 2023-11-17 RX ADMIN — SODIUM CHLORIDE, SODIUM LACTATE, POTASSIUM CHLORIDE, AND CALCIUM CHLORIDE: .6; .31; .03; .02 INJECTION, SOLUTION INTRAVENOUS at 09:20

## 2023-11-17 RX ADMIN — PROPOFOL 150 MG: 10 INJECTION, EMULSION INTRAVENOUS at 11:15

## 2023-11-17 NOTE — DISCHARGE SUMMARY
Discharge Summary Outpatient Procedure Podiatry [facility] 69 y.o. male MRN: [id number]  Unit/Bed#: OR POOL Encounter: [id number]    Admission Date: 11/17/2023     Admitting Diagnosis: Closed dislocation of tarsometatarsal joint of left foot, initial encounter [S93.325A]    Discharge Diagnosis: same    Procedures Performed: OPEN REDUCTION W/ INTERNAL FIXATION (ORIF) FOOT FIRST AND SECOND TMTJ: 90919 (CPT®)    Complications: none    Condition at Discharge: stable    Discharge instructions/Information to patient and family:   See after visit summary for information provided to patient and family. Provisions for Follow-Up Care/Important appointments:  See after visit summary for information related to follow-up care and any pertinent home health orders. Discharge Medications:  See after visit summary for reconciled discharge medications provided to patient and family.

## 2023-11-17 NOTE — ANESTHESIA PROCEDURE NOTES
Anesthesia Notable Event    Date/Time: 11/17/2023 2:56 PM    Performed by: Benita Elder MD  Authorized by: Benita Elder MD

## 2023-11-17 NOTE — INTERVAL H&P NOTE
H&P reviewed. After examining the patient I find no changes in the patients condition since the H&P had been written.     Vitals:    11/17/23 0935   BP: (!) 194/89   Pulse: (!) 52   Resp: 18   Temp: (!) 96.8 °F (36 °C)   SpO2: 97%

## 2023-11-17 NOTE — OP NOTE
OPERATIVE REPORT - Podiatry  PATIENT NAME: Ryan Beebe    :  [id number]  MRN: [id number]  Pt Location:  OR ROOM 12    SURGERY DATE: 2023    Surgeon(s) and Role:     * Hector Jaeger DPM - Primary     * Mick Figueroa DPM - Assisting    Pre-op Diagnosis:  Closed dislocation of tarsometatarsal joint of left foot, initial encounter [S93.325A]    Post-Op Diagnosis Codes:     * Closed dislocation of tarsometatarsal joint of left foot, initial encounter [S93.325A]    Procedure(s) (LRB):  OPEN REDUCTION W/ INTERNAL FIXATION (ORIF) FOOT FIRST AND SECOND TMTJ (Left)    Specimen(s):  * No specimens in log *    Estimated Blood Loss:   Minimal    Drains:  * No LDAs found *    Anesthesia Type:   Choice with 20 ml of 1% Lidocaine and 0.5% Bupivacaine in a 1:1 mixture    Hemostasis:  Pneumatic ankle tourniquet set at 250 mmHg for 120 mins  Direct compression, electrocautery    Materials:  Implant Name Type Inv.  Item Serial No.  Lot No. LRB No. Used Action   SCREW WAYNE MED LISFRANC 3.7 X 38MM - [id number]  SCREW WAYNE MED LISFRANC 3.7 X 38MM  [facility]  Left 1 Implanted   Lisframe Plate 5 hole      Left 2 Implanted   2.7mm x 16mm Nonlock screw    [facility]  Left 1 Implanted   2.7mm x 14mm Lock screw    [facility]  Left 1 Implanted   2.7mm x 22mm Lock Screw    [facility]  Left 1 Implanted   2.7mm x 20mm Lock Screw    [facility]  Left 2 Implanted   2.7mm x 12mm Lock Screw    [facility]  Left 1 Implanted   2.7mm x 24mm non lock screw    [facility]  Left 1 Implanted   SCREW BONE LISFRANC 3.7 X 32MM - [id number]  SCREW BONE LISFRANC 3.7 X 32MM  [facility]  Left 1 Implanted   2.7mm x 28mm lock screw    [facility]  Left 2 Implanted   2.7mm x 26mm Lock Screw    [facility]  Left 1 Implanted     3-0 and 4-0 vicryl  3-0 nylon    Injectables:  None    Operative Findings:  Displacement of Lisfranc ligament complex with metatarsal base laterally displaced, manual reduction was able to get Lisfranc complex back in anatomic alignment    Complications:   None    Procedure and Technique:     Under mild sedation, the patient was brought into the operating room and placed on the operating room table in the supine position. IV sedation was achieved by anesthesia team and a universal timeout was performed where all parties are in agreement of correct patient, correct procedure and correct site. A pneumatic tourniquet was then placed over the patient's left lower extremity with ample padding. A ankle block was performed consisting of 20 ml of local anesthetic. The foot was then prepped and draped in the usual aseptic manner. An esmarch bandage was used to exsangunate the foot and the pneumatic tourniquet was then inflated to 250 mmHg. ORIF Lisfranc joint:   Attention was turned to operative dorsal midfoot foot and a standard linear incision dorsal 2nd TMT was made. Sharp dissection was taken through the skin and bovie electrocautery was used to control hemostasis. Scissor dissection was used for the deep dissection using caution to protect the superficial peroneal nerve branches traversing the surgical field. The interval between EHL and EHB tendons were identified and the deep neurovascular bundle was identified, mobilized and retracted. We immediately encountered the zone of injury with lateral displacement of 2nd metatarsal base. We continued dissection over the cuneiforms and saw dorsal ligamentous injury to the intercuneiform C1C2. With the jefferson clamp from Eb, adequate reduction was made at the lisfranc complex, bringing 2nd metatarsal base back medially. We open reduced the base of the 2nd metatarsal to the lateral border of the medial cuneiform and inserted a guidewire for a 3.7 Lisfranc screw from medial cuneiform to 2nd metatarsal base. We used a Hintermann distractor to expose the C1M1, C1C2 and C1M2 joints.  We used the wooden handle periosteal elevator to remove the cartilage in these joints. We then irrigated thoroughly and then fenestrated the subchondral plate with a 1.3JL drill bit destroying the subchondral bone to encourage a healing surface. We then placed [employer] bone graft into the arthrodesis site. Next we did the same thing to the C2M2 joint (2nd TMT joint) first removing the cartilage then drilling to prepare the bony surface for healing. We placed bone graft into the 2nd TMT joint as well. We pinned the 1st TMT joint adducting it to correct the deformity. We then pinned the 2nd TMT joint and then placed a guidewire from medial cuneiform to 2nd MT base. Next, we pinned the intercuneiform joint and confirmed reduction on fluoroscopy. We then drilled a placed 4.0mm headless compression screws to stabilize the reduction. We did the same thing to the 1st and 2nd TMT joint ensuring no plantar or dorsiflexion. Using the large bone clamp, we open reduced the base of the 2nd metatarsal to the lateral border of the medial cuneiform and inserted a guidewire for a 4.0 headless compression screw through an independent incision. We confirmed the reduction on fluoroscopy and when we were satisfied in all planes, we drilled and then inserted the cannulated screw to hold the reduction. Next we sized a bridging dorsal plate from the middle cuneiform to the 2nd metatarsal and then another compression screw plantarly from the medial cuneiform to the middle cuneiform. We confirmed plate position and maintained midfoot reduction on fluoroscopy and then filled the plates with appropriate sized screws. Next, with the medial and middle columns securely fixed, we performed an examination under anesthesia of the 4th and 5th TMT joints. These joints were stable and did not require k-wire pinning. Fluoroscopy was used to confirm alignment. Dr. Ligia Taylor independently reviewed and assessed all fluoroscopic images. The wound was copiously irrigated.     The surgical incision was irrigated with copious amounts of normal sterile saline. The periosteal and capsular structures were reapproximated using 3-0 vicryl. Subcutaneous closure was obtained utilizing 4-0 vicryl. Skin edges were reapproximated and closure was obtained utilizing 3-0 nylon. The foot was then cleansed and dried. A postoperative injection consisting of 20 ml of exparel was performed. The incision site was dressed with betadine soaked adaptic, gauze. This was then covered with a Ni and an ACE wrap. The tourniquet was deflated at approximately 120 min and normal hyperemic response was noted to all digits. The patient tolerated the procedure and anesthesia well without immediate complications and transferred to PACU with vital signs stable. Dr. Janie Rangel was present during the entire procedure and participated in all key aspects. SIGNATURE: Merlin Mutter, DPM  DATE: November 17, 2023  TIME: 1:58 PM      Portions of the record may have been created with voice recognition software. Occasional wrong word or "sound a like" substitutions may have occurred due to the inherent limitations of voice recognition software. Read the chart carefully and recognize, using context, where substitutions have occurred.

## 2023-11-17 NOTE — ANESTHESIA PREPROCEDURE EVALUATION
Procedure:  OPEN REDUCTION W/ INTERNAL FIXATION (ORIF) FOOT (Left: Foot)    Relevant Problems   CARDIO   (+) Hyperlipidemia      GI/HEPATIC   (+) Fatty infiltration of liver      MUSCULOSKELETAL   (+) Acute right-sided low back pain without sciatica        Physical Exam    Airway    Mallampati score: II  TM Distance: >3 FB  Neck ROM: full     Dental   No notable dental hx     Cardiovascular      Pulmonary      Other Findings        Anesthesia Plan  ASA Score- 2     Anesthesia Type- general with ASA Monitors. Additional Monitors:     Airway Plan: LMA. Plan Factors-Exercise tolerance (METS): >4 METS. Chart reviewed. Patient summary reviewed. Patient is not a current smoker. Patient did not smoke on day of surgery. Induction- intravenous. Postoperative Plan- Plan for postoperative opioid use. Informed Consent- Anesthetic plan and risks discussed with patient. I personally reviewed this patient with the CRNA. Discussed and agreed on the Anesthesia Plan with the CRNA. Roldan Teran

## 2023-11-17 NOTE — DISCHARGE INSTR - AVS FIRST PAGE
Dr. Paul Carolina Instructions    1. Take your prescribed medication as directed. 2. Upon arrival at home, lie down and elevate your surgical foot on 2 pillows. 3. Stay off your feet as much as possible for the first 24-48 hours. This is when your feet first swell and may become painful. After 48 hours you may begin following these restrictions:   Non-Weightbearing to surgical foot      4. Drink large quantities of water. Consume no alcohol. Continue a well-balanced diet. 5. Report any unusual discomfort or fever to this office. 6. A limited amount of discomfort and swelling is to be expected. In some cases the skin may take on a bruised appearance. The surgical cleansing solution that was applied to your foot prior to the operation is dark in color and the operation site may appear to be oozing when it actually is not. 7. A slight amount of blood is to be expected, and is no cause for alarm. Do not remove the dressings. If there is active bleeding and if the bleeding persists, add additional gauze to the bandage, apply direct pressure, elevate your feet and call this office. 8. Do not get the dressings wet. As regular bathing may be inconvenient, sponge baths are recommended. 9. When anesthesia wears off and if any discomfort should be present, apply an ice pack directly over the operated area for 15 minute intervals for several hours or until the pain leaves. (USE IN EXCESS OF 15 MINUTES COULD CAUSE FROSTBITE). Do not use hot water bags or electric pads. A convenient icepack can be made by placing ice cubes in a plastic bag and covering this with a towel. 10. Take over-the-counter laxative for constipation, this is common with use of narcotic medications.

## 2023-11-17 NOTE — ANESTHESIA POSTPROCEDURE EVALUATION
Post-Op Assessment Note    CV Status:  Stable  Pain Score: 0    Pain management: adequate       Mental Status:  Sleepy   Hydration Status:  Euvolemic   PONV Controlled:  Controlled   Airway Patency:  Patent     Post Op Vitals Reviewed: Yes    No anethesia notable event occurred.     Staff: CRNA           /62 (11/17/23 1351)    Temp 98.2 °F (36.8 °C) (11/17/23 1351)    Pulse 58 (11/17/23 1351)   Resp 12 (11/17/23 1351)    SpO2 99 % (11/17/23 1351)

## 2023-11-28 ENCOUNTER — OFFICE VISIT (OUTPATIENT)
Dept: PODIATRY | Facility: CLINIC | Age: 78
End: 2023-11-28

## 2023-11-28 VITALS — HEIGHT: 68 IN | WEIGHT: 206 LBS | BODY MASS INDEX: 31.22 KG/M2

## 2023-11-28 DIAGNOSIS — S93.622A LISFRANC'S SPRAIN, LEFT, INITIAL ENCOUNTER: Primary | ICD-10-CM

## 2023-11-28 PROCEDURE — 99024 POSTOP FOLLOW-UP VISIT: CPT | Performed by: PODIATRIST

## 2023-11-28 NOTE — PROGRESS NOTES
PATIENT:  Fiorella Kruger      1945    ASSESSMENT     1. Lisfranc's sprain, left, initial encounter               PLAN  Patient is doing well post-operatively. Sutures left intact. Incision was cleaned with betadine and DSD applied to be kept C/D/I. Continue post-op care as instructed. Stressed on patient compliance about proper off-loading, staying off of feet. Discussed risks / complications related to non-compliance including non-healing bone, deformity, and hardware problems. Pt to call the office for any post op problems. RA in 1 week. HISTORY OF PRESENT ILLNESS  Patient presents for post-op appointment. Post-op pain is under control and resolving well. The patient is feeling well and in good spirits. He was not compliant about WB status. He has been walking little on the splint. REVIEW OF SYSTEMS  GENERAL: No fever or chills. HEART: No chest pain, or palpitation  RESPIRATORY:  No SOB or cough  GI: No Nausea, vomit or diarrhea  NEUROLOGIC: No syncope or acute weakness  MUSCULOSKELETAL: No calf pain or edema. PHYSICAL EXAMINATION    Ht 5' 8" (1.727 m)   Wt 93.4 kg (206 lb)   BMI 31.32 kg/m²     GENERAL  The patient appears in NAD / non-toxic. Afebrile. VSS    VASCULAR EXAM  Pedal pulses and vascular status are intact. No calf pain or edema bilaterally. No cyanosis. DERMATOLOGIC EXAM  Incisions are coapted and healing well. No signs of infection. No active drainage. Normal post-op edema and ecchymosis. No necrosis or dehiscence. NEUROLOGIC EXAM  AAO X 3. No focal neurologic deficit. Neurologic status is intact BLE. MUSCULOSKELETAL EXAM  Normal post-op findings. ROM intact. No fluctuation or crepitus.

## 2023-12-05 ENCOUNTER — OFFICE VISIT (OUTPATIENT)
Dept: PODIATRY | Facility: CLINIC | Age: 78
End: 2023-12-05

## 2023-12-05 VITALS
SYSTOLIC BLOOD PRESSURE: 195 MMHG | HEART RATE: 55 BPM | DIASTOLIC BLOOD PRESSURE: 87 MMHG | BODY MASS INDEX: 31.32 KG/M2 | HEIGHT: 68 IN

## 2023-12-05 DIAGNOSIS — S93.622A LISFRANC'S SPRAIN, LEFT, INITIAL ENCOUNTER: Primary | ICD-10-CM

## 2023-12-05 PROCEDURE — 99024 POSTOP FOLLOW-UP VISIT: CPT | Performed by: PODIATRIST

## 2023-12-05 NOTE — PROGRESS NOTES
PATIENT:  Mitra Willard      1945    ASSESSMENT     1. Lisset's sprain, left, initial encounter  Cam Boot               PLAN  Sutures removed and steri strips applied. Instructed skin care and protection. Instructed NWB of left foot. CAM walker dispensed. Stressed on patient compliance about proper off-loading, staying off of feet. Discussed risks / complications related to non-compliance including non-healing bone, deformity, and hardware problems. Pt to call the office for any post op problems. RA in 3 weeks. HISTORY OF PRESENT ILLNESS  Patient presents for post-op appointment. Post-op pain is resolving well. The patient is feeling well and in good spirits. He was not compliant about WB status. REVIEW OF SYSTEMS  GENERAL: No fever or chills. HEART: No chest pain, or palpitation  RESPIRATORY:  No SOB or cough  GI: No Nausea, vomit or diarrhea  NEUROLOGIC: No syncope or acute weakness  MUSCULOSKELETAL: No calf pain or edema. PHYSICAL EXAMINATION    BP (!) 195/87 (BP Location: Right arm, Patient Position: Sitting, Cuff Size: Standard)   Pulse 55   Ht 5' 8" (1.727 m)   BMI 31.32 kg/m²     GENERAL  The patient appears in NAD / non-toxic. Afebrile. VSS    VASCULAR EXAM  Pedal pulses and vascular status are intact. No calf pain or edema bilaterally. No cyanosis. DERMATOLOGIC EXAM  Incisions are coapted and healed. No signs of infection. No drainage. Normal post-op edema and ecchymosis. No necrosis or dehiscence. NEUROLOGIC EXAM  AAO X 3. No focal neurologic deficit. Neurologic status is intact BLE. MUSCULOSKELETAL EXAM  Normal post-op findings. ROM intact. No fluctuation or crepitus.

## 2023-12-26 ENCOUNTER — APPOINTMENT (OUTPATIENT)
Dept: RADIOLOGY | Age: 78
End: 2023-12-26
Payer: COMMERCIAL

## 2023-12-26 ENCOUNTER — OFFICE VISIT (OUTPATIENT)
Dept: PODIATRY | Facility: CLINIC | Age: 78
End: 2023-12-26

## 2023-12-26 VITALS
HEART RATE: 61 BPM | DIASTOLIC BLOOD PRESSURE: 87 MMHG | HEIGHT: 68 IN | SYSTOLIC BLOOD PRESSURE: 157 MMHG | WEIGHT: 206 LBS | BODY MASS INDEX: 31.22 KG/M2

## 2023-12-26 DIAGNOSIS — S93.622A LISFRANC'S SPRAIN, LEFT, INITIAL ENCOUNTER: Primary | ICD-10-CM

## 2023-12-26 DIAGNOSIS — S93.622A LISFRANC'S SPRAIN, LEFT, INITIAL ENCOUNTER: ICD-10-CM

## 2023-12-26 PROCEDURE — 73630 X-RAY EXAM OF FOOT: CPT

## 2023-12-26 PROCEDURE — 99024 POSTOP FOLLOW-UP VISIT: CPT | Performed by: PODIATRIST

## 2023-12-26 NOTE — PROGRESS NOTES
"        PATIENT:  Erlin Ware      1945    ASSESSMENT     1. Lisfranc's sprain, left, initial encounter  XR foot 3+ vw left               PLAN  X-ray was obtained and reviewed with her.  Surgical site is stable.  Instructed NWB of left foot with CAM walker.  Stressed on patient compliance about proper off-loading, staying off of feet.  Discussed risks / complications related to non-compliance including non-healing bone, deformity, and hardware problems.  Pt to call the office for any post op problems.  RA in 4 weeks.        HISTORY OF PRESENT ILLNESS  Patient presents for post-op appointment.  He has been walking on left foot without walker or crutches.  He also was not wearing CAM boot.  He presents with surgical shoe today.        REVIEW OF SYSTEMS  GENERAL: No fever or chills.    HEART: No chest pain, or palpitation  RESPIRATORY:  No SOB or cough  GI: No Nausea, vomit or diarrhea  NEUROLOGIC: No syncope or acute weakness  MUSCULOSKELETAL: No calf pain or edema.      PHYSICAL EXAMINATION    /87   Pulse 61   Ht 5' 8\" (1.727 m)   Wt 93.4 kg (206 lb)   BMI 31.32 kg/m²     GENERAL  The patient appears in NAD / non-toxic. Afebrile. VSS    VASCULAR EXAM  Pedal pulses and vascular status are intact.  No calf pain or edema bilaterally.  No cyanosis.    DERMATOLOGIC EXAM  No signs of infection. No drainage.  Decreased edema and ecchymosis. N o necrosis or dehiscence.    NEUROLOGIC EXAM  AAO X 3.  No focal neurologic deficit.  Neurologic status is intact BLE.    MUSCULOSKELETAL EXAM  Normal post-op findings. ROM intact.  No fluctuation or crepitus.  "

## 2024-01-23 ENCOUNTER — APPOINTMENT (OUTPATIENT)
Dept: RADIOLOGY | Age: 79
End: 2024-01-23
Payer: COMMERCIAL

## 2024-01-23 ENCOUNTER — OFFICE VISIT (OUTPATIENT)
Dept: PODIATRY | Facility: CLINIC | Age: 79
End: 2024-01-23

## 2024-01-23 VITALS — BODY MASS INDEX: 31.32 KG/M2 | HEIGHT: 68 IN

## 2024-01-23 DIAGNOSIS — S93.622A LISFRANC'S SPRAIN, LEFT, INITIAL ENCOUNTER: ICD-10-CM

## 2024-01-23 DIAGNOSIS — S93.622A LISFRANC'S SPRAIN, LEFT, INITIAL ENCOUNTER: Primary | ICD-10-CM

## 2024-01-23 PROCEDURE — 73630 X-RAY EXAM OF FOOT: CPT

## 2024-01-23 PROCEDURE — 99024 POSTOP FOLLOW-UP VISIT: CPT | Performed by: PODIATRIST

## 2024-01-23 NOTE — PROGRESS NOTES
"        PATIENT:  Erlin Ware      1945    ASSESSMENT     1. Lisfranc's sprain, left, initial encounter  XR foot 3+ vw left                 PLAN  X-ray was obtained and reviewed with her.  Surgical site is stable.  Wb with CAM boot.   Stressed on patient compliance about proper off-loading, staying off of feet.  Continue post-op care as instructed.  RA in 4 weeks.        HISTORY OF PRESENT ILLNESS  Patient presents for post-op appointment.  He presents with CAM walker today.  He has been walking on left foot without walker or crutches.        REVIEW OF SYSTEMS  GENERAL: No fever or chills.    HEART: No chest pain, or palpitation  RESPIRATORY:  No SOB or cough  GI: No Nausea, vomit or diarrhea  NEUROLOGIC: No syncope or acute weakness  MUSCULOSKELETAL: No calf pain or edema.      PHYSICAL EXAMINATION    Ht 5' 8\" (1.727 m)   BMI 31.32 kg/m²     GENERAL  The patient appears in NAD / non-toxic. Afebrile. VSS    VASCULAR EXAM  Pedal pulses and vascular status are intact.  No calf pain or edema bilaterally.  No cyanosis.    DERMATOLOGIC EXAM  No signs of infection. No drainage.  Swelling is resolving well.  No necrosis or dehiscence.    NEUROLOGIC EXAM  AAO X 3.  No focal neurologic deficit.  Neurologic status is intact BLE.    MUSCULOSKELETAL EXAM  Normal post-op findings. ROM intact.  No fluctuation or crepitus.  "

## 2024-02-20 ENCOUNTER — OFFICE VISIT (OUTPATIENT)
Dept: PODIATRY | Facility: CLINIC | Age: 79
End: 2024-02-20
Payer: COMMERCIAL

## 2024-02-20 VITALS
HEART RATE: 57 BPM | SYSTOLIC BLOOD PRESSURE: 165 MMHG | HEIGHT: 68 IN | WEIGHT: 206 LBS | BODY MASS INDEX: 31.22 KG/M2 | DIASTOLIC BLOOD PRESSURE: 86 MMHG

## 2024-02-20 DIAGNOSIS — S93.622A LISFRANC'S SPRAIN, LEFT, INITIAL ENCOUNTER: Primary | ICD-10-CM

## 2024-02-20 PROCEDURE — 99212 OFFICE O/P EST SF 10 MIN: CPT | Performed by: PODIATRIST

## 2024-02-20 NOTE — PROGRESS NOTES
"        PATIENT:  Erlin Ware      1945    ASSESSMENT     1. Lisfranc's sprain, left, initial encounter  Ambulatory referral to Physical Therapy                 PLAN  Slowly wean off CAM walker.  Start PT/OT.  Instructed supportive care for residual pain and swelling to left foot.  RA in 6 weeks.        HISTORY OF PRESENT ILLNESS  Patient presents for follow-up.  Tolerates WB with CAM boot.  He still has some aches and swelling in left foot.       REVIEW OF SYSTEMS  GENERAL: No fever or chills.    HEART: No chest pain, or palpitation  RESPIRATORY:  No SOB or cough  GI: No Nausea, vomit or diarrhea  NEUROLOGIC: No syncope or acute weakness    PHYSICAL EXAMINATION    /86   Pulse 57   Ht 5' 8\" (1.727 m)   Wt 93.4 kg (206 lb)   BMI 31.32 kg/m²     GENERAL  The patient appears in NAD / non-toxic. Afebrile. VSS    VASCULAR EXAM  Pedal pulses and vascular status are intact.  No calf pain or edema bilaterally.  No cyanosis.    DERMATOLOGIC EXAM  No signs of infection. No drainage. No necrosis or dehiscence.    NEUROLOGIC EXAM  AAO X 3.  No focal neurologic deficit.  Neurologic status is intact BLE.    MUSCULOSKELETAL EXAM  Normal post-op findings. ROM intact.  Residual swelling left foot.  Chronic pes planus noted bilaterally.  No fluctuation or crepitus.  "

## 2024-03-12 ENCOUNTER — EVALUATION (OUTPATIENT)
Dept: PHYSICAL THERAPY | Facility: MEDICAL CENTER | Age: 79
End: 2024-03-12
Payer: COMMERCIAL

## 2024-03-12 DIAGNOSIS — S93.622D LISFRANC'S SPRAIN, LEFT, SUBSEQUENT ENCOUNTER: ICD-10-CM

## 2024-03-12 PROCEDURE — 97161 PT EVAL LOW COMPLEX 20 MIN: CPT | Performed by: PHYSICAL THERAPIST

## 2024-03-12 PROCEDURE — 97110 THERAPEUTIC EXERCISES: CPT | Performed by: PHYSICAL THERAPIST

## 2024-03-12 NOTE — PROGRESS NOTES
PT Evaluation     Today's date: 3/12/2024  Patient name: Erlin Ware  : 1945  MRN: [id number]  Referring provider: Parrish Salazar DPM  Dx:   Encounter Diagnosis     ICD-10-CM    1. Tarafranc's sprain, left, subsequent encounter  S93.622D Ambulatory referral to Physical Therapy          Start Time: 1445  Stop Time: 1530  Total time in clinic (min): 45 minutes    Assessment  Assessment details: Pt is a 78 y.o. male who presents to OP PT with L ankle/foot pain, decreased L ankle ROM, decreased L ankle strength, impaired gait mechanics and decreased activity tolerance. These impairments limit the patient from participating in ambulation at OF, decreased tolerance to caring for wife, and decreased ability to participate in work related activity.  Pt reports increased difficulty with follow up visits due to appointments for wife and insurance coverage.  Pt educated about impairments noted today and plan for PT moving forward that is open to change as needed.  HEP reviewed with patient to ensure understanding and performance.  Pt educated to bring in [facility] for better fit.  I believe this patient is a good candidate for and will benefit from skilled physical therapy for manual therapy to the L ankle, ROM exercises, strengthening exercises and mechanics training to improve limitations and assist the patient to return to PLOF.  Thank you for the opportunity to participate in Erlin Ware's care.    Positive Prognostic Indicators: desire to improve    Negative Prognostic Indicators: insurance       Impairments: abnormal gait, abnormal muscle tone, abnormal or restricted ROM, abnormal movement, activity intolerance, impaired balance, impaired physical strength, lacks appropriate home exercise program and pain with function    Symptom irritability: lowUnderstanding of Dx/Px/POC: good   Prognosis: good    Goals  STGs: 4 weeks  1) Pt will have SPR decrease of 2 units at worst  2) pt will have improved L ankle  strength to 5/5 globally  3) pt will have improved foto score of 10 points    LTGs: 8 weeks  1) pt will be independent with HEP by D/C  2) pt will be independent with symptom management by D/C  3) pt will demonstrate improved gait mechanics with appropriate heel strike in order to demonstrate improved gait mechanics by DC.       Plan  Patient would benefit from: skilled physical therapy  Planned modality interventions: cryotherapy and thermotherapy: hydrocollator packs  Planned therapy interventions: joint mobilization, manual therapy, neuromuscular re-education, patient education, strengthening, therapeutic activities, stretching, therapeutic exercise, home exercise program, gait training, functional ROM exercises, flexibility and balance/weight bearing training  Frequency: 1x week  Duration in weeks: 12  Plan of Care beginning date: 3/12/2024  Plan of Care expiration date: 6/4/2024  Treatment plan discussed with: patient        Subjective Evaluation    History of Present Illness  Mechanism of injury: DOS:  11/17/23 lisfranc fracture   JESSICA:      Subjective Comments: pt reports that 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  Surgery in November.  Since then he has been weaning from the boot.  Overall is doing well.  Reports that he has neuropathy and off balanced at baseline.  Minimal pain currently.    Pain   Rest: 0/10       Relieving Factors: rest    Exacerbating Factors: WB    Sleeping: increased difficulty    Home Set-up: does ok with stairs    ADLs: compensates, but overall doing well.    Work/Hobbies: house/yard work.  Has to assist wife in care for PD    Previous Treatment: n/a    Goals:  return to baseline.               Objective     Palpation     Additional Palpation Details  No TTP    Active Range of Motion   Left Ankle/Foot   Dorsiflexion (kf): 0 degrees   Plantar flexion: 50 degrees   Inversion: 16 degrees   Eversion: 20 degrees     Right Ankle/Foot   Dorsiflexion (kf): 0 degrees  "  Plantar flexion: 46 degrees   Inversion: 40 degrees   Eversion: 14 degrees     Passive Range of Motion   Left Ankle/Foot    Dorsiflexion (kf): 11 degrees   Plantar flexion: 52 degrees   Inversion: 20 degrees   Eversion: 23 degrees     Right Ankle/Foot    Dorsiflexion (kf): 8 degrees    Plantar flexion: 50 degrees   Inversion: 41 degrees   Eversion: 14 degrees     Strength/Myotome Testing     Left Ankle/Foot   Dorsiflexion: 4+  Plantar flexion: 4+  Inversion: 4+  Eversion: 4+    Right Ankle/Foot   Dorsiflexion: 5  Plantar flexion: 4+  Inversion: 5  Eversion: 5    Ambulation     Ambulation: Level Surfaces   Ambulation without assistive device: independent    Observational Gait   Gait: asymmetric   Decreased walking speed and stride length.              Precautions:   Patient Active Problem List   Diagnosis    Fall    Ambulatory dysfunction    Hyperlipidemia    Fatty infiltration of liver    Acute right-sided low back pain without sciatica    Closed nondisplaced fracture of fourth metatarsal bone of left foot    Lisfranc's sprain, left, initial encounter    Post-operative state     Past Surgical History:   Procedure Laterality Date    [place] OPEN TREATMENT TARSOMETATARSAL JOINT DISLOCATION Left 11/17/2023    Procedure: OPEN REDUCTION W/ INTERNAL FIXATION (ORIF) FOOT FIRST AND SECOND TMTJ;  Surgeon: Parrish Salazar DPM;  Location: [facility];  Service: Podiatry      Past Medical History:   Diagnosis Date    Hyperlipidemia     Hypertension           Manuals 3/12            L ankle PROM                                                    Neuro Re-Ed             Ankle 4 way Gtb x10 ea.            FT balance             Tandem balance                                                                 Ther Ex             Ankle 4 way x10            Calf stretch 30\"x3            bike             Side stepping             Step ups             Lateral step ups             Mini squats                          Ther Activity                    "                    Gait Training                                       Modalities

## 2024-03-19 ENCOUNTER — APPOINTMENT (OUTPATIENT)
Dept: PHYSICAL THERAPY | Facility: MEDICAL CENTER | Age: 79
End: 2024-03-19
Payer: COMMERCIAL

## 2024-03-26 ENCOUNTER — OFFICE VISIT (OUTPATIENT)
Dept: PHYSICAL THERAPY | Facility: MEDICAL CENTER | Age: 79
End: 2024-03-26
Payer: COMMERCIAL

## 2024-03-26 DIAGNOSIS — S93.622D LISFRANC'S SPRAIN, LEFT, SUBSEQUENT ENCOUNTER: Primary | ICD-10-CM

## 2024-03-26 PROCEDURE — 97110 THERAPEUTIC EXERCISES: CPT | Performed by: PHYSICAL THERAPIST

## 2024-03-26 PROCEDURE — 97112 NEUROMUSCULAR REEDUCATION: CPT | Performed by: PHYSICAL THERAPIST

## 2024-03-26 NOTE — PROGRESS NOTES
Daily Note     Today's date: 3/26/2024  Patient name: Erlin Ware  : 1945  MRN: [id number]  Referring provider: Parrish Salazar DPM  Dx:   Encounter Diagnosis     ICD-10-CM    1. Lisfranc's sprain, left, subsequent encounter  S93.622D           Start Time: 1530  Stop Time: 1620  Total time in clinic (min): 50 minutes    Subjective: pt reports that he is doing well.  Presents in CAM boot.  Reports he has been trying to not wear the cam boot as much.       Objective: See treatment diary below      Assessment: Tolerated treatment well. Pt tolerated PROM well.  Progression of ankle strengthening was completed and tolerated well.  Pt progressed to standing exercises in sneaker with good tolerance.  Pt was educated about soreness to expect and to continue to progress weaning form the boot as tolerated.  HEP progressed.  We will continue to progress as tolerated.  Patient would benefit from continued PT      Plan: Continue per plan of care.      Precautions:   Patient Active Problem List   Diagnosis    Fall    Ambulatory dysfunction    Hyperlipidemia    Fatty infiltration of liver    Acute right-sided low back pain without sciatica    Closed nondisplaced fracture of fourth metatarsal bone of left foot    Lisfranc's sprain, left, initial encounter    Post-operative state     Past Surgical History:   Procedure Laterality Date    [place] OPEN TREATMENT TARSOMETATARSAL JOINT DISLOCATION Left 2023    Procedure: OPEN REDUCTION W/ INTERNAL FIXATION (ORIF) FOOT FIRST AND SECOND TMTJ;  Surgeon: Parrish Salazar DPM;  Location: [facility];  Service: Podiatry      Past Medical History:   Diagnosis Date    Hyperlipidemia     Hypertension       Per MD: Slowly wean off CAM walker.  Start PT/OT.  Instructed supportive care for residual pain and swelling to left foot.  RA in 6 weeks.     Manuals 3/12 3/26           L ankle PROM  RK                                                  Neuro Re-Ed             Ankle 4 way Gtb x10 ea. Blk x20 ea.   "         FT balance  NV           Tandem balance  NV           Ankle circles  X20 ea.           Wobble board  Seated x20 ea.            Tandem walking  3 laps                        Ther Ex             Ankle 4 way x10            Calf stretch 30\"x3 30\"x3           bike             Side stepping  5 laps           Step ups             Lateral step ups             Mini squats             Standing hip abd  X10 ea. Alt.           Standing marches  X10 ea. alt           Standing hip ext  X10 ea. alt           Heel raise  x20                        Ther Activity                                       Gait Training             Heel toe mechanics  100ft                        Modalities                                            "

## 2024-04-02 ENCOUNTER — OFFICE VISIT (OUTPATIENT)
Dept: PODIATRY | Facility: CLINIC | Age: 79
End: 2024-04-02
Payer: COMMERCIAL

## 2024-04-02 VITALS
DIASTOLIC BLOOD PRESSURE: 82 MMHG | BODY MASS INDEX: 31.22 KG/M2 | HEART RATE: 71 BPM | HEIGHT: 68 IN | WEIGHT: 206 LBS | SYSTOLIC BLOOD PRESSURE: 168 MMHG

## 2024-04-02 DIAGNOSIS — S93.622A LISFRANC'S SPRAIN, LEFT, INITIAL ENCOUNTER: Primary | ICD-10-CM

## 2024-04-02 PROCEDURE — 99213 OFFICE O/P EST LOW 20 MIN: CPT | Performed by: PODIATRIST

## 2024-04-02 NOTE — PROGRESS NOTES
"        PATIENT:  Erlin Ware      1945    ASSESSMENT     1. Lisfranc's sprain, left, initial encounter                   PLAN  Reviewed medical records.  Reviewed the notes from PT.  Wean off surgical shoe.  He may advance shoes as tolerated.  Recommended him to be compliant with PT order.  Instructed supportive care for residual pain and swelling to left foot.  Sent him for compression socks.  He will return in 8 weeks.      HISTORY OF PRESENT ILLNESS  Patient presents for follow-up.  Tolerates WB with surgical shoe.  He still has some aches and swelling in left foot.  He only went to PT twice.  He presents with ACE wrap, but it was misplaced.         REVIEW OF SYSTEMS  GENERAL: No fever or chills.    HEART: No chest pain, or palpitation  RESPIRATORY:  No SOB or cough  GI: No Nausea, vomit or diarrhea  NEUROLOGIC: No syncope or acute weakness    PHYSICAL EXAMINATION    /82   Pulse 71   Ht 5' 8\" (1.727 m)   Wt 93.4 kg (206 lb) Comment: verbal  BMI 31.32 kg/m²     GENERAL  The patient appears in NAD / non-toxic. Afebrile. VSS    VASCULAR EXAM  Pedal pulses and vascular status are intact.  No calf pain or edema bilaterally.  No cyanosis.    DERMATOLOGIC EXAM  No signs of infection. No drainage. No necrosis or dehiscence.    NEUROLOGIC EXAM  AAO X 3.  No focal neurologic deficit.  Neurologic status is intact BLE.    MUSCULOSKELETAL EXAM  Normal post-op findings. ROM intact.  Residual swelling left foot.  Minimal pain on palpation.  Chronic pes planus noted bilaterally.  No fluctuation or crepitus.  "

## 2024-04-23 ENCOUNTER — OFFICE VISIT (OUTPATIENT)
Dept: PHYSICAL THERAPY | Facility: MEDICAL CENTER | Age: 79
End: 2024-04-23
Payer: COMMERCIAL

## 2024-04-23 DIAGNOSIS — S93.622D LISFRANC'S SPRAIN, LEFT, SUBSEQUENT ENCOUNTER: Primary | ICD-10-CM

## 2024-04-23 PROCEDURE — 97112 NEUROMUSCULAR REEDUCATION: CPT | Performed by: PHYSICAL THERAPIST

## 2024-04-23 PROCEDURE — 97140 MANUAL THERAPY 1/> REGIONS: CPT | Performed by: PHYSICAL THERAPIST

## 2024-04-23 PROCEDURE — 97110 THERAPEUTIC EXERCISES: CPT | Performed by: PHYSICAL THERAPIST

## 2024-04-23 NOTE — PROGRESS NOTES
Daily Note     Today's date: 2024  Patient name: Erlin Ware  : 1945  MRN: [id number]  Referring provider: Parrish Salazar DPM  Dx:   Encounter Diagnosis     ICD-10-CM    1. Lisfranc's sprain, left, subsequent encounter  S93.622D           Start Time: 1533  Stop Time: 1613  Total time in clinic (min): 40 minutes    Subjective: pt reports that he has not been wearing a sneaker due to his foot not fitting in them.  He continues with surgical shoe.  Reports minimal complaints, but reports increased discomfort due to neuropathy.       Objective: See treatment diary below      Assessment: Tolerated treatment well. PROM continued with good tolerance.  Exercises completed accommodating surgical sneaker with no normal sneaker available.  Pt was strongly encouraged to begin transitioning to sneaker in order to prevent complication from prolonged use of boot/surgical sneaker. We will continue to progress as tolerated. Patient would benefit from continued PT      Plan: Continue per plan of care.      Precautions:   Patient Active Problem List   Diagnosis    Fall    Ambulatory dysfunction    Hyperlipidemia    Fatty infiltration of liver    Acute right-sided low back pain without sciatica    Closed nondisplaced fracture of fourth metatarsal bone of left foot    Lisfranc's sprain, left, initial encounter    Post-operative state     Past Surgical History:   Procedure Laterality Date    [place] OPEN TREATMENT TARSOMETATARSAL JOINT DISLOCATION Left 2023    Procedure: OPEN REDUCTION W/ INTERNAL FIXATION (ORIF) FOOT FIRST AND SECOND TMTJ;  Surgeon: Parrish Salazar DPM;  Location: [facility];  Service: Podiatry      Past Medical History:   Diagnosis Date    Hyperlipidemia     Hypertension       Per MD: Slowly wean off CAM walker.  Start PT/OT.  Instructed supportive care for residual pain and swelling to left foot.  RA in 6 weeks.     Manuals 3/12 3/26 4/23          L ankle PROM  RK RK                                                "  Neuro Re-Ed             Ankle 4 way Gtb x10 ea. Blk x20 ea. Blk x20 ea.          FT balance  NV Foam EC 30\"x3          Tandem balance  NV Firm EO 30\"x2 ea.          Ankle circles  X20 ea.           Wobble board  Seated x20 ea.            Tandem walking  3 laps                        Ther Ex             Ankle 4 way x10            Calf stretch 30\"x3 30\"x3           bike             Side stepping  5 laps           Step ups             Lateral step ups             Mini squats             Standing hip abd  X10 ea. Alt. 2X10 ea.           Standing marches  X10 ea. alt Walking 5 laps          Standing hip ext  X10 ea. alt 2x10 ea.          Heel raise  x20 NV                       Ther Activity                                       Gait Training             Heel toe mechanics  100ft                        Modalities                                              "

## 2024-04-29 ENCOUNTER — OFFICE VISIT (OUTPATIENT)
Dept: PHYSICAL THERAPY | Facility: MEDICAL CENTER | Age: 79
End: 2024-04-29
Payer: COMMERCIAL

## 2024-04-29 DIAGNOSIS — S93.622D LISFRANC'S SPRAIN, LEFT, SUBSEQUENT ENCOUNTER: Primary | ICD-10-CM

## 2024-04-29 PROCEDURE — 97110 THERAPEUTIC EXERCISES: CPT | Performed by: PHYSICAL THERAPIST

## 2024-04-29 PROCEDURE — 97140 MANUAL THERAPY 1/> REGIONS: CPT | Performed by: PHYSICAL THERAPIST

## 2024-04-29 NOTE — PROGRESS NOTES
Daily Note     Today's date: 2024  Patient name: Erlin Ware  : 1945  MRN: [id number]  Referring provider: Parrish Salazar DPM  Dx:   Encounter Diagnosis     ICD-10-CM    1. Lisfranc's sprain, left, subsequent encounter  S93.622D           Start Time: 1530  Stop Time: 1615  Total time in clinic (min): 45 minutes    Subjective: pt reports to OP PT with the surgical sneaker. Pt reports that he has not gotten new sneakers yet.      Objective: See treatment diary below      Assessment: Tolerated treatment well.  Pt was re-educated about the importance of transitioning to a sneaker in order to improve foot and ankle strength as well as balance.  Pt also reported swelling in the foot.  Pt was questioned about compression stockings and reports that surgeon did recommend this to him but he has not obtained them. Pt was educated to contact surgeons office about compression strength recommendation.  Treatment was tolerated well without complaints.  Progression of step ups was completed and tolerated well.  We will continue to monitor and progress as tolerated.   Patient would benefit from continued PT      Plan: Continue per plan of care.      Precautions:   Patient Active Problem List   Diagnosis    Fall    Ambulatory dysfunction    Hyperlipidemia    Fatty infiltration of liver    Acute right-sided low back pain without sciatica    Closed nondisplaced fracture of fourth metatarsal bone of left foot    Lisfranc's sprain, left, initial encounter    Post-operative state     Past Surgical History:   Procedure Laterality Date    [place] OPEN TREATMENT TARSOMETATARSAL JOINT DISLOCATION Left 2023    Procedure: OPEN REDUCTION W/ INTERNAL FIXATION (ORIF) FOOT FIRST AND SECOND TMTJ;  Surgeon: Parrish Salazar DPM;  Location:  MAIN OR;  Service: Podiatry      Past Medical History:   Diagnosis Date    Hyperlipidemia     Hypertension       Per MD: Slowly wean off CAM walker.  Start PT/OT.  Instructed supportive care for residual  "pain and swelling to left foot.  RA in 6 weeks.     Manuals 3/12 3/26 4/23 4/29         L ankle PROM  RK RK RK                                                Neuro Re-Ed             Ankle 4 way Gtb x10 ea. Blk x20 ea. Blk x20 ea. Blk x20 ea.         FT balance  NV Foam EC 30\"x3          Tandem balance  NV Firm EO 30\"x2 ea.          Ankle circles  X20 ea.           Wobble board  Seated x20 ea.   Seated x20 ea.          Tandem walking  3 laps           Toe crunches    2x20         Ther Ex             Ankle 4 way x10            Calf stretch 30\"x3 30\"x3           bike             Side stepping  5 laps           Step ups    6\" x10 ea.          Lateral step ups    6\" x10 ea.          Mini squats             Standing hip abd  X10 ea. Alt. 2X10 ea.  2X10 ea.          Standing marches  X10 ea. alt Walking 5 laps X20 ea. alt         Standing hip ext  X10 ea. alt 2x10 ea. 2X10 ea.          Heel raise  x20 NV                       Ther Activity                                       Gait Training             Heel toe mechanics  100ft                        Modalities                                                "

## 2024-05-13 ENCOUNTER — OFFICE VISIT (OUTPATIENT)
Dept: PHYSICAL THERAPY | Facility: MEDICAL CENTER | Age: 79
End: 2024-05-13
Payer: COMMERCIAL

## 2024-05-13 DIAGNOSIS — S93.622D LISFRANC'S SPRAIN, LEFT, SUBSEQUENT ENCOUNTER: Primary | ICD-10-CM

## 2024-05-13 PROCEDURE — 97110 THERAPEUTIC EXERCISES: CPT | Performed by: PHYSICAL THERAPIST

## 2024-05-13 PROCEDURE — 97140 MANUAL THERAPY 1/> REGIONS: CPT | Performed by: PHYSICAL THERAPIST

## 2024-05-13 PROCEDURE — 97112 NEUROMUSCULAR REEDUCATION: CPT | Performed by: PHYSICAL THERAPIST

## 2024-05-13 NOTE — PROGRESS NOTES
PT Re-Evaluation     Today's date: 2024  Patient name: Erlin Ware  : 1945  MRN: [id number]  Referring provider: Parrish Salazar DPM  Dx:   Encounter Diagnosis     ICD-10-CM    1. Jarrodanc's sprain, left, subsequent encounter  S93.622D           Start Time: 1402  Stop Time: 1447  Total time in clinic (min): 45 minutes    Assessment  Assessment details: Pt is a 78 y.o. male who has completed 5 PT sessions to this date.  The patient has made improvements in L ankle ROM, improved L ankle strength and improved gait mechanics.  However, the patient continues to have increased difficulty with increased levels of pain (surgical versus neuropathy), decreased L ankle strength, impair gait mechanics and decreased activity tolerance.  Pt has been working on transitioning to sneaker.  Standing strengthening and balance exercises completed and tolerated well.  Pt has had increased difficulty attending OP PT secondary to home obligations.  Reviewed HEP and completed in clinic with good tolerance.  All other questions were addressed.  Pt is scheduled to see surgeon tomorrow.  Pt is electing to hold off on scheduling future PT appointments pending surgeon follow up. I believe this patient will continue to benefit from skilled PT for continued L ankle ROM exercises, continued L ankle strengthening exercises, continued balance exercises and continued mechanics training to improve symptoms  and assist the patent to improved QOL.          Impairments: abnormal gait, abnormal muscle tone, abnormal or restricted ROM, abnormal movement, activity intolerance, impaired balance, impaired physical strength, lacks appropriate home exercise program and pain with function    Symptom irritability: lowUnderstanding of Dx/Px/POC: good   Prognosis: good    Goals  STGs: 4 weeks  1) Pt will have SPR decrease of 2 units at worst- not met  2) pt will have improved L ankle strength to 5/5 globally- part met  3) pt will have improved foto score  "of 10 points- part met    LTGs: 8 weeks  1) pt will be independent with HEP by D/C- part met  2) pt will be independent with symptom management by D/C- part met  3) pt will demonstrate improved gait mechanics with appropriate heel strike in order to demonstrate improved gait mechanics by DC.- part met       Plan  Patient would benefit from: skilled physical therapy  Planned modality interventions: cryotherapy and thermotherapy: hydrocollator packs  Planned therapy interventions: joint mobilization, manual therapy, neuromuscular re-education, patient education, strengthening, therapeutic activities, stretching, therapeutic exercise, home exercise program, gait training, functional ROM exercises, flexibility and balance/weight bearing training  Frequency: 1x week  Duration in weeks: 12  Plan of Care beginning date: 3/12/2024  Plan of Care expiration date: 6/4/2024  Treatment plan discussed with: patient        Subjective Evaluation    History of Present Illness  Mechanism of injury: DOS:  11/17/23 lisfranc fracture   JESSICA:      Subjective Comments: pt reports that he notes improvement in his foot. Pt continues to have pain in the arch of the foot. Pt has neuropathy. Some of the exercises he \"just can't do\". Pt reports that he is still attempting to do them.     Pain   Rest: 5/10  Best: 4/10  Worst: \"gets to a point where I can't do the exercise\".  - even though this is going on, I still try to do it.  Reports that this is the neuropathy          Objective     Palpation     Additional Palpation Details  No TTP    Active Range of Motion   Left Ankle/Foot   Dorsiflexion (kf): 8 degrees   Plantar flexion: 50 degrees   Inversion: 34 degrees   Eversion: 10 degrees     Right Ankle/Foot   Dorsiflexion (kf): 0 degrees   Plantar flexion: 46 degrees   Inversion: 40 degrees   Eversion: 14 degrees     Passive Range of Motion   Left Ankle/Foot    Dorsiflexion (kf): 12 degrees   Plantar flexion: 52 degrees   Inversion: 37 degrees " "  Eversion: 12 degrees     Right Ankle/Foot    Dorsiflexion (kf): 8 degrees    Plantar flexion: 50 degrees   Inversion: 41 degrees   Eversion: 14 degrees     Strength/Myotome Testing     Left Ankle/Foot   Dorsiflexion: 5  Plantar flexion: 5  Inversion: 4+  Eversion: 4+    Right Ankle/Foot   Dorsiflexion: 5  Plantar flexion: 4+  Inversion: 5  Eversion: 5    Ambulation     Ambulation: Level Surfaces   Ambulation without assistive device: independent    Ambulation: Stairs   Ascend stairs: independent  Pattern: reciprocal  Railings: two rails  Descend stairs: independent  Pattern: reciprocal  Railings: two rails    Additional Stairs Ambulation Details  Railings used for balance    Observational Gait   Gait: asymmetric   Decreased walking speed and stride length.     Additional Observational Gait Details  BL toe out           Precautions:   Patient Active Problem List   Diagnosis    Fall    Ambulatory dysfunction    Hyperlipidemia    Fatty infiltration of liver    Acute right-sided low back pain without sciatica    Closed nondisplaced fracture of fourth metatarsal bone of left foot    Lisfranc's sprain, left, initial encounter    Post-operative state     Past Surgical History:   Procedure Laterality Date    [place] OPEN TREATMENT TARSOMETATARSAL JOINT DISLOCATION Left 11/17/2023    Procedure: OPEN REDUCTION W/ INTERNAL FIXATION (ORIF) FOOT FIRST AND SECOND TMTJ;  Surgeon: Parrish Salazar DPM;  Location:  MAIN OR;  Service: Podiatry      Past Medical History:   Diagnosis Date    Hyperlipidemia     Hypertension       Per MD: Slowly wean off CAM walker.  Start PT/OT.  Instructed supportive care for residual pain and swelling to left foot.  RA in 6 weeks.     Manuals 3/12 3/26 4/23 4/29 5/13        L ankle PROM  RK RK RK RK                                               Neuro Re-Ed             Ankle 4 way Gtb x10 ea. Blk x20 ea. Blk x20 ea. Blk x20 ea. Blk x20 ea.        FT balance  NV Foam EC 30\"x3  Foam EC 30\"x3        Tandem " "balance  NV Firm EO 30\"x2 ea.  Firm EO 30\"x2 ea.        Ankle circles  X20 ea.   X20 ea.        Wobble board  Seated x20 ea.   Seated x20 ea.          Tandem walking  3 laps           Toe crunches    2x20         Ther Ex             Ankle 4 way x10    X20 ea.        Calf stretch 30\"x3 30\"x3   30\"x3        bike             Side stepping  5 laps           Step ups    6\" x10 ea.          Lateral step ups    6\" x10 ea.          Mini squats             Standing hip abd  X10 ea. Alt. 2X10 ea.  2X10 ea.  Foam x10 ea.        Standing marches  X10 ea. alt Walking 5 laps X20 ea. alt Foam x10 ea.        Standing hip ext  X10 ea. alt 2x10 ea. 2X10 ea.  Foam x10 ea.        Heel raise  x20 NV  2x10                     Ther Activity             Objective measures     RK                     Gait Training             Heel toe mechanics  100ft                        Modalities                                              "

## 2024-05-14 ENCOUNTER — OFFICE VISIT (OUTPATIENT)
Dept: PODIATRY | Facility: CLINIC | Age: 79
End: 2024-05-14
Payer: COMMERCIAL

## 2024-05-14 VITALS — HEIGHT: 68 IN | BODY MASS INDEX: 31.52 KG/M2 | WEIGHT: 208 LBS

## 2024-05-14 DIAGNOSIS — S93.622S LISFRANC'S SPRAIN, LEFT, SEQUELA: Primary | ICD-10-CM

## 2024-05-14 PROCEDURE — 99213 OFFICE O/P EST LOW 20 MIN: CPT | Performed by: PODIATRIST

## 2024-05-14 NOTE — PROGRESS NOTES
PATIENT:  Erlin Ware      1945    ASSESSMENT     1. Lisfranc's sprain, left, sequela                   PLAN  Reviewed medical records.  Reviewed the notes from PT.  He may advance activity as tolerated.  Instructed supportive care for residual pain and swelling to left foot.  Recommended him to wear compression stocking and proper footwear.  Overall his symptoms have been better and there is not much I can offer at this time.  Will d/c him from our care.      HISTORY OF PRESENT ILLNESS  Patient presents for follow-up.  Tolerates WB with sneakers now.  His pain is about 3-4 out of 10.  He still notices some swelling on and off.  He went to PT a couple of times.  He presents with ill fitted shoes today.   He is wearing ankle support for residual swelling.        PAST MEDICAL HISTORY:  Past Medical History:   Diagnosis Date    Hyperlipidemia     Hypertension        PAST SURGICAL HISTORY:  Past Surgical History:   Procedure Laterality Date    [place] OPEN TREATMENT TARSOMETATARSAL JOINT DISLOCATION Left 11/17/2023    Procedure: OPEN REDUCTION W/ INTERNAL FIXATION (ORIF) FOOT FIRST AND SECOND TMTJ;  Surgeon: Parrish Salazar DPM;  Location: [facility];  Service: Podiatry        ALLERGIES:  Amoxicillin, Atorvastatin, Cerivastatin, Colchicine, Fluvastatin, Pitavastatin, Statins, and Verapamil    MEDICATIONS:  Current Outpatient Medications   Medication Sig Dispense Refill    aspirin 325 mg tablet Take 1 tablet (325 mg total) by mouth daily 30 tablet 0    ezetimibe (ZETIA) 10 mg tablet Take by mouth      losartan (COZAAR) 25 mg tablet Take 25 mg by mouth daily      rosuvastatin (CRESTOR) 5 mg tablet Take 10 mg by mouth daily      Zinc Picolinate 25 MG TABS Take 25 mg by mouth daily      Bioflavonoid Products (JORDI-C) TABS Take 1 tablet by mouth daily (Patient not taking: Reported on 12/26/2023)      Coenzyme Q10 (COQ-10 PO) Take 1 tablet by mouth daily (Patient not taking: Reported on 12/26/2023)      cyclobenzaprine  (FLEXERIL) 10 mg tablet Take 1 tablet (10 mg total) by mouth 3 (three) times a day (Patient not taking: Reported on 12/26/2023) 21 tablet 0    famotidine (PEPCID) 20 mg tablet Take 1 tablet (20 mg total) by mouth 2 (two) times a day for 5 days 10 tablet 0    Flaxseed, Linseed, (FLAXSEED OIL) 1000 MG CAPS Take 1,000 mg by mouth daily (Patient not taking: Reported on 2/20/2024)      folic acid (FOLVITE) 1 mg tablet Take by mouth daily (Patient not taking: Reported on 5/14/2024)      gabapentin (NEURONTIN) 100 mg capsule Take 300 mg by mouth 2 (two) times a day (Patient not taking: Reported on 12/26/2023)      glucosamine-chondroitin 500-400 MG tablet Take 1 tablet by mouth daily (Patient not taking: Reported on 2/20/2024)      ibuprofen (MOTRIN) 400 mg tablet Take 1 tablet (400 mg total) by mouth every 8 (eight) hours for 3 days 9 tablet 0    Lecithin 1200 MG CAPS Take 1,200 mg by mouth daily (Patient not taking: Reported on 1/23/2024)      polyethylene glycol (MIRALAX) 17 g packet Take 17 g by mouth daily (Patient not taking: Reported on 12/26/2023) 14 each 0    senna-docusate sodium (SENOKOT S) 8.6-50 mg per tablet Take 1 tablet by mouth daily at bedtime (Patient not taking: Reported on 12/26/2023) 30 tablet 0     No current facility-administered medications for this visit.       SOCIAL HISTORY:  Social History     Socioeconomic History    Marital status: /Civil Union     Spouse name: None    Number of children: None    Years of education: None    Highest education level: None   Occupational History    None   Tobacco Use    Smoking status: Former    Smokeless tobacco: Former   Substance and Sexual Activity    Alcohol use: No    Drug use: No    Sexual activity: None   Other Topics Concern    None   Social History Narrative    None     Social Determinants of Health     Financial Resource Strain: Not on file   Food Insecurity: Not on file   Transportation Needs: Not on file   Physical Activity: Not on file  "  Stress: Not on file   Social Connections: Not on file   Intimate Partner Violence: Not on file   Housing Stability: Not on file        REVIEW OF SYSTEMS  GENERAL: No fever or chills.    HEART: No chest pain, or palpitation  RESPIRATORY:  No SOB or cough  GI: No Nausea, vomit or diarrhea  NEUROLOGIC: No syncope or acute weakness    PHYSICAL EXAMINATION    Ht 5' 8\" (1.727 m)   Wt 94.3 kg (208 lb)   BMI 31.63 kg/m²     GENERAL  The patient appears in NAD / non-toxic. Afebrile. VSS    VASCULAR EXAM  Pedal pulses and vascular status are intact.  No calf pain or edema bilaterally.  No cyanosis.    DERMATOLOGIC EXAM  No signs of infection. No drainage. No necrosis or dehiscence.    NEUROLOGIC EXAM  AAO X 3.  No focal neurologic deficit.  Neurologic status is intact BLE.    MUSCULOSKELETAL EXAM  ROM intact.  Residual swelling left foot.  Minimal pain on palpation.  Chronic pes planus noted bilaterally.  No fluctuation or crepitus.  "

## (undated) DEVICE — DISPOSABLE OR TOWEL: Brand: CARDINAL HEALTH

## (undated) DEVICE — SINGLE PORT MANIFOLD: Brand: NEPTUNE 2

## (undated) DEVICE — CAST PADDING 4 IN SYNTHETIC NON-STRL

## (undated) DEVICE — CUFF TOURNIQUET 18 X 4 IN QUICK CONNECT DISP 1 BLADDER

## (undated) DEVICE — CHLORAPREP HI-LITE 26ML ORANGE

## (undated) DEVICE — PENCIL ELECTROSURG E-Z CLEAN -0035H

## (undated) DEVICE — SUT VICRYL 4-0 PS-2 27 IN J426H

## (undated) DEVICE — NEEDLE 25G X 1 1/2

## (undated) DEVICE — INTENDED FOR TISSUE SEPARATION, AND OTHER PROCEDURES THAT REQUIRE A SHARP SURGICAL BLADE TO PUNCTURE OR CUT.: Brand: BARD-PARKER ® SAFETYLOCK CARBON RIB-BACK BLADES

## (undated) DEVICE — STERILE POLYISOPRENE POWDER-FREE SURGICAL GLOVES WITH EMOLLIENT COATING: Brand: PROTEXIS

## (undated) DEVICE — DRILL 2.0MM

## (undated) DEVICE — TEMP FIXATION K-WIRE
Type: IMPLANTABLE DEVICE | Site: FOOT | Status: NON-FUNCTIONAL
Brand: DARCO
Removed: 2023-11-17

## (undated) DEVICE — CURITY NON-ADHERENT STRIPS: Brand: CURITY

## (undated) DEVICE — SUT ETHILON 4-0 PS-2 18 IN 1667H

## (undated) DEVICE — STERILE POLYISOPRENE POWDER-FREE SURGICAL GLOVES: Brand: PROTEXIS

## (undated) DEVICE — GAUZE SPONGES,16 PLY: Brand: CURITY

## (undated) DEVICE — ACE WRAP 4 IN UNSTERILE

## (undated) DEVICE — GLOVE INDICATOR PI UNDERGLOVE SZ 7 BLUE

## (undated) DEVICE — SUT VICRYL 3-0 PS-2 18 IN J497G

## (undated) DEVICE — STANDARD SURGICAL GOWN, L: Brand: CONVERTORS

## (undated) DEVICE — LISFRANC DRILL BIT: Brand: CHARLOTTE

## (undated) DEVICE — PADDING CAST 4 IN  COTTON STRL

## (undated) DEVICE — STOCKINETTE 2P PREROLLD 6X60

## (undated) DEVICE — NEEDLE BLUNT 18 G X 1 1/2IN

## (undated) DEVICE — SYRINGE 10ML LL

## (undated) DEVICE — ABDOMINAL PAD: Brand: DERMACEA

## (undated) DEVICE — SCD SEQUENTIAL COMPRESSION COMFORT SLEEVE MEDIUM KNEE LENGTH: Brand: KENDALL SCD

## (undated) DEVICE — SINGLE TROCAR WIRE
Type: IMPLANTABLE DEVICE | Site: FOOT | Status: NON-FUNCTIONAL
Brand: CHARLOTTE
Removed: 2023-11-17

## (undated) DEVICE — DRAPE C-ARM X-RAY

## (undated) DEVICE — KERLIX BANDAGE ROLL: Brand: KERLIX

## (undated) DEVICE — BETHLEHEM UNIVERSAL  MIONR EXT: Brand: CARDINAL HEALTH

## (undated) DEVICE — GLOVE INDICATOR PI UNDERGLOVE SZ 6.5 BLUE

## (undated) DEVICE — GLOVE PI ULTRA TOUCH SZ.7.5

## (undated) DEVICE — STRETCH BANDAGE: Brand: CURITY

## (undated) DEVICE — ACE WRAP 6 IN UNSTERILE

## (undated) DEVICE — POV-IOD SOLUTION 4OZ BT